# Patient Record
Sex: FEMALE | Race: BLACK OR AFRICAN AMERICAN | NOT HISPANIC OR LATINO | URBAN - METROPOLITAN AREA
[De-identification: names, ages, dates, MRNs, and addresses within clinical notes are randomized per-mention and may not be internally consistent; named-entity substitution may affect disease eponyms.]

---

## 2017-04-04 ENCOUNTER — EMERGENCY (EMERGENCY)
Facility: HOSPITAL | Age: 82
LOS: 1 days | Discharge: HOME | End: 2017-04-04
Attending: EMERGENCY MEDICINE | Admitting: HOSPITALIST
Payer: MEDICARE

## 2017-04-04 VITALS
SYSTOLIC BLOOD PRESSURE: 136 MMHG | HEART RATE: 60 BPM | OXYGEN SATURATION: 99 % | HEIGHT: 61 IN | DIASTOLIC BLOOD PRESSURE: 72 MMHG

## 2017-04-04 DIAGNOSIS — T14.8 OTHER INJURY OF UNSPECIFIED BODY REGION: ICD-10-CM

## 2017-04-04 DIAGNOSIS — I48.0 PAROXYSMAL ATRIAL FIBRILLATION: ICD-10-CM

## 2017-04-04 DIAGNOSIS — E78.00 PURE HYPERCHOLESTEROLEMIA, UNSPECIFIED: ICD-10-CM
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
## 2017-04-04 DIAGNOSIS — H40.9 UNSPECIFIED GLAUCOMA: ICD-10-CM

## 2017-04-04 DIAGNOSIS — R07.89 OTHER CHEST PAIN: ICD-10-CM
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
## 2017-04-04 DIAGNOSIS — L03.90 CELLULITIS, UNSPECIFIED: ICD-10-CM

## 2017-04-04 DIAGNOSIS — Z95.0 PRESENCE OF CARDIAC PACEMAKER: Chronic | ICD-10-CM

## 2017-04-04 LAB
ALBUMIN SERPL ELPH-MCNC: 3.1 G/DL — LOW (ref 3.3–5)
ALP SERPL-CCNC: 115 U/L — SIGNIFICANT CHANGE UP (ref 40–120)
ALT FLD-CCNC: 34 U/L — SIGNIFICANT CHANGE UP (ref 12–78)
ANION GAP SERPL CALC-SCNC: 6 MMOL/L — SIGNIFICANT CHANGE UP (ref 5–17)
APTT BLD: 30.7 SEC — SIGNIFICANT CHANGE UP (ref 27.5–37.4)
AST SERPL-CCNC: 30 U/L — SIGNIFICANT CHANGE UP (ref 15–37)
BASOPHILS # BLD AUTO: 0.1 K/UL — SIGNIFICANT CHANGE UP (ref 0–0.2)
BASOPHILS NFR BLD AUTO: 1.3 % — SIGNIFICANT CHANGE UP (ref 0–2)
BILIRUB SERPL-MCNC: 0.3 MG/DL — SIGNIFICANT CHANGE UP (ref 0.2–1.2)
BUN SERPL-MCNC: 31 MG/DL — HIGH (ref 7–23)
CALCIUM SERPL-MCNC: 8.5 MG/DL — SIGNIFICANT CHANGE UP (ref 8.5–10.1)
CHLORIDE SERPL-SCNC: 104 MMOL/L — SIGNIFICANT CHANGE UP (ref 96–108)
CK MB BLD-MCNC: 1.6 % — SIGNIFICANT CHANGE UP (ref 0–3.5)
CK MB CFR SERPL CALC: 1.1 NG/ML — SIGNIFICANT CHANGE UP (ref 0.5–3.6)
CK SERPL-CCNC: 67 U/L — SIGNIFICANT CHANGE UP (ref 26–192)
CO2 SERPL-SCNC: 34 MMOL/L — HIGH (ref 22–31)
CREAT SERPL-MCNC: 1.3 MG/DL — SIGNIFICANT CHANGE UP (ref 0.5–1.3)
EOSINOPHIL # BLD AUTO: 0.1 K/UL — SIGNIFICANT CHANGE UP (ref 0–0.5)
EOSINOPHIL NFR BLD AUTO: 2.4 % — SIGNIFICANT CHANGE UP (ref 0–6)
GLUCOSE SERPL-MCNC: 112 MG/DL — HIGH (ref 70–99)
HCT VFR BLD CALC: 34.7 % — SIGNIFICANT CHANGE UP (ref 34.5–45)
HGB BLD-MCNC: 11.4 G/DL — LOW (ref 11.5–15.5)
INR BLD: 1.05 RATIO — SIGNIFICANT CHANGE UP (ref 0.88–1.16)
LYMPHOCYTES # BLD AUTO: 1 K/UL — SIGNIFICANT CHANGE UP (ref 1–3.3)
LYMPHOCYTES # BLD AUTO: 17.8 % — SIGNIFICANT CHANGE UP (ref 13–44)
MCHC RBC-ENTMCNC: 29.8 PG — SIGNIFICANT CHANGE UP (ref 27–34)
MCHC RBC-ENTMCNC: 33 GM/DL — SIGNIFICANT CHANGE UP (ref 32–36)
MCV RBC AUTO: 90.3 FL — SIGNIFICANT CHANGE UP (ref 80–100)
MONOCYTES # BLD AUTO: 0.9 K/UL — SIGNIFICANT CHANGE UP (ref 0–0.9)
MONOCYTES NFR BLD AUTO: 15.4 % — HIGH (ref 2–14)
NEUTROPHILS # BLD AUTO: 3.5 K/UL — SIGNIFICANT CHANGE UP (ref 1.8–7.4)
NEUTROPHILS NFR BLD AUTO: 63 % — SIGNIFICANT CHANGE UP (ref 43–77)
PLATELET # BLD AUTO: 139 K/UL — LOW (ref 150–400)
POTASSIUM SERPL-MCNC: 3 MMOL/L — LOW (ref 3.5–5.3)
POTASSIUM SERPL-SCNC: 3 MMOL/L — LOW (ref 3.5–5.3)
PROT SERPL-MCNC: 7.1 GM/DL — SIGNIFICANT CHANGE UP (ref 6–8.3)
PROTHROM AB SERPL-ACNC: 11.5 SEC — SIGNIFICANT CHANGE UP (ref 9.8–12.7)
RBC # BLD: 3.84 M/UL — SIGNIFICANT CHANGE UP (ref 3.8–5.2)
RBC # FLD: 13.1 % — SIGNIFICANT CHANGE UP (ref 11–15)
SODIUM SERPL-SCNC: 144 MMOL/L — SIGNIFICANT CHANGE UP (ref 135–145)
TROPONIN I SERPL-MCNC: 0.03 NG/ML — SIGNIFICANT CHANGE UP (ref 0.01–0.04)
WBC # BLD: 5.5 K/UL — SIGNIFICANT CHANGE UP (ref 3.8–10.5)
WBC # FLD AUTO: 5.5 K/UL — SIGNIFICANT CHANGE UP (ref 3.8–10.5)

## 2017-04-04 PROCEDURE — 99285 EMERGENCY DEPT VISIT HI MDM: CPT

## 2017-04-04 PROCEDURE — 93971 EXTREMITY STUDY: CPT | Mod: 26,RT

## 2017-04-04 NOTE — ED ADULT TRIAGE NOTE - CHIEF COMPLAINT QUOTE
pt c/o of hole in the right lower  leg (calf)  since January visiting nurse and doctor  have checked it when she went to [facility] they gave her antibiotics but its  gotten deeper  its infected and her pacemaker felt like it moved.

## 2017-04-04 NOTE — ED PROVIDER NOTE - MEDICAL DECISION MAKING DETAILS
Pt w above dx, no improvement w outpt tx of abx.  US w ?chronic DVT.  Pt given abx in ED, admitted for further eval/mgmt.

## 2017-04-04 NOTE — ED ADULT NURSE NOTE - CHIEF COMPLAINT QUOTE
pt c/o of hole in the right lower  leg (calf)  since January visiting nurse and doctor  have checked it when she went to [facility] they gave her antibiotics but its  gotten deeper  its infected and her pacemaker felt like it moved.

## 2017-04-04 NOTE — ED PROVIDER NOTE - OBJECTIVE STATEMENT
Pertinent PMH/PSH/FHx/SHx and Review of Systems contained within:    86yo F w PMH of glaucoma, HTN, HL, paroxysmal Afib, CHF, s/p PPM presents to ED c/o RLE pain & chest discomfort.  Pt & daughter report she has completed abx for RLE but sx have not improved & pt has worsening pain & swelling in RLE.  Denies trauma, fever, chills, Pertinent PMH/PSH/FHx/SHx and Review of Systems contained within:    88yo F w PMH of glaucoma, HTN, HL, paroxysmal Afib, CHF, s/p PPM presents to ED c/o RLE pain & chest discomfort.  Pt & daughter report she has completed abx for RLE but sx have not improved & pt has worsening pain & swelling in RLE.  Denies trauma, fever, chills, SOB, abd pain, vomiting, diarrhea.  Pt states she also felt L anterior chest discomfort, described as a "wave."    No fever/chills, No photophobia/eye pain/changes in vision, No ear pain/sore throat/dysphagia, no cough/wheeze/stridor, No abdominal pain, No N/V/D, no dysuria/frequency/discharge, No neck/back pain, no rash, no changes in neurological status/function.

## 2017-04-04 NOTE — ED PROVIDER NOTE - PHYSICAL EXAMINATION
Gen: Alert, speaking in complete sentences;  Head: NC, AT, EOMI, normal lids/conjunctiva;  ENT: normal hearing, patent oropharynx, MMM;  Neck: supple, no tenderness/meningismus, FROM;  Pulm: Bilateral clear BS, normal resp effort, no wheeze/stridor/retractions;  CV: RRR, no M/R/G, +dist pulses, no TTP/fluctuance over PPM;  Abd: soft, NT/ND, +BS, no guarding/rebound tenderness;  Mskel: RLE swollen compared to LLE, +eschar over posterior calf + surrounding TTP, no active bleeding/drainage, sensation intact;  Skin: no rash;  Neuro: AAOx3, no sensory/motor deficits

## 2017-04-05 DIAGNOSIS — H40.9 UNSPECIFIED GLAUCOMA: ICD-10-CM

## 2017-04-05 DIAGNOSIS — E78.00 PURE HYPERCHOLESTEROLEMIA, UNSPECIFIED: ICD-10-CM

## 2017-04-05 DIAGNOSIS — Z29.9 ENCOUNTER FOR PROPHYLACTIC MEASURES, UNSPECIFIED: ICD-10-CM

## 2017-04-05 DIAGNOSIS — T14.8 OTHER INJURY OF UNSPECIFIED BODY REGION: ICD-10-CM

## 2017-04-05 DIAGNOSIS — R07.89 OTHER CHEST PAIN: ICD-10-CM

## 2017-04-05 DIAGNOSIS — I10 ESSENTIAL (PRIMARY) HYPERTENSION: ICD-10-CM

## 2017-04-05 DIAGNOSIS — I48.0 PAROXYSMAL ATRIAL FIBRILLATION: ICD-10-CM

## 2017-04-05 LAB
ANION GAP SERPL CALC-SCNC: 8 MMOL/L — SIGNIFICANT CHANGE UP (ref 5–17)
APPEARANCE UR: ABNORMAL
BILIRUB UR-MCNC: NEGATIVE — SIGNIFICANT CHANGE UP
BUN SERPL-MCNC: 26 MG/DL — HIGH (ref 7–23)
CALCIUM SERPL-MCNC: 8.5 MG/DL — SIGNIFICANT CHANGE UP (ref 8.5–10.1)
CHLORIDE SERPL-SCNC: 105 MMOL/L — SIGNIFICANT CHANGE UP (ref 96–108)
CHOLEST SERPL-MCNC: 137 MG/DL — SIGNIFICANT CHANGE UP (ref 10–199)
CK MB BLD-MCNC: 1.7 % — SIGNIFICANT CHANGE UP (ref 0–3.5)
CK MB BLD-MCNC: 1.8 % — SIGNIFICANT CHANGE UP (ref 0–3.5)
CK MB CFR SERPL CALC: 1 NG/ML — SIGNIFICANT CHANGE UP (ref 0.5–3.6)
CK MB CFR SERPL CALC: 1 NG/ML — SIGNIFICANT CHANGE UP (ref 0.5–3.6)
CK SERPL-CCNC: 55 U/L — SIGNIFICANT CHANGE UP (ref 26–192)
CK SERPL-CCNC: 60 U/L — SIGNIFICANT CHANGE UP (ref 26–192)
CO2 SERPL-SCNC: 32 MMOL/L — HIGH (ref 22–31)
COLOR SPEC: YELLOW — SIGNIFICANT CHANGE UP
CREAT SERPL-MCNC: 1.18 MG/DL — SIGNIFICANT CHANGE UP (ref 0.5–1.3)
DIFF PNL FLD: ABNORMAL
GLUCOSE SERPL-MCNC: 91 MG/DL — SIGNIFICANT CHANGE UP (ref 70–99)
GLUCOSE UR QL: NEGATIVE MG/DL — SIGNIFICANT CHANGE UP
HCT VFR BLD CALC: 34.3 % — LOW (ref 34.5–45)
HDLC SERPL-MCNC: 68 MG/DL — SIGNIFICANT CHANGE UP (ref 40–125)
HGB BLD-MCNC: 11.1 G/DL — LOW (ref 11.5–15.5)
KETONES UR-MCNC: NEGATIVE — SIGNIFICANT CHANGE UP
LEUKOCYTE ESTERASE UR-ACNC: ABNORMAL
LIPID PNL WITH DIRECT LDL SERPL: 62 MG/DL — SIGNIFICANT CHANGE UP
MCHC RBC-ENTMCNC: 29.4 PG — SIGNIFICANT CHANGE UP (ref 27–34)
MCHC RBC-ENTMCNC: 32.4 GM/DL — SIGNIFICANT CHANGE UP (ref 32–36)
MCV RBC AUTO: 91 FL — SIGNIFICANT CHANGE UP (ref 80–100)
NITRITE UR-MCNC: NEGATIVE — SIGNIFICANT CHANGE UP
PH UR: 6 — SIGNIFICANT CHANGE UP (ref 4.8–8)
PLATELET # BLD AUTO: 139 K/UL — LOW (ref 150–400)
POTASSIUM SERPL-MCNC: 3.2 MMOL/L — LOW (ref 3.5–5.3)
POTASSIUM SERPL-SCNC: 3.2 MMOL/L — LOW (ref 3.5–5.3)
PROT UR-MCNC: NEGATIVE MG/DL — SIGNIFICANT CHANGE UP
RBC # BLD: 3.77 M/UL — LOW (ref 3.8–5.2)
RBC # FLD: 13.2 % — SIGNIFICANT CHANGE UP (ref 11–15)
SODIUM SERPL-SCNC: 145 MMOL/L — SIGNIFICANT CHANGE UP (ref 135–145)
SP GR SPEC: 1.01 — SIGNIFICANT CHANGE UP (ref 1.01–1.02)
TOTAL CHOLESTEROL/HDL RATIO MEASUREMENT: 2 RATIO — LOW (ref 3.3–7.1)
TRIGL SERPL-MCNC: 37 MG/DL — SIGNIFICANT CHANGE UP (ref 10–149)
TROPONIN I SERPL-MCNC: 0.04 NG/ML — SIGNIFICANT CHANGE UP (ref 0.01–0.04)
TROPONIN I SERPL-MCNC: 0.04 NG/ML — SIGNIFICANT CHANGE UP (ref 0.01–0.04)
UROBILINOGEN FLD QL: NEGATIVE MG/DL — SIGNIFICANT CHANGE UP
WBC # BLD: 5.6 K/UL — SIGNIFICANT CHANGE UP (ref 3.8–10.5)
WBC # FLD AUTO: 5.6 K/UL — SIGNIFICANT CHANGE UP (ref 3.8–10.5)

## 2017-04-05 PROCEDURE — 71010: CPT | Mod: 26

## 2017-04-05 PROCEDURE — 73590 X-RAY EXAM OF LOWER LEG: CPT | Mod: 26,RT

## 2017-04-05 PROCEDURE — 99223 1ST HOSP IP/OBS HIGH 75: CPT

## 2017-04-05 RX ORDER — LOSARTAN POTASSIUM 100 MG/1
50 TABLET, FILM COATED ORAL DAILY
Qty: 0 | Refills: 0 | Status: DISCONTINUED | OUTPATIENT
Start: 2017-04-05 | End: 2017-04-07

## 2017-04-05 RX ORDER — POTASSIUM CHLORIDE 20 MEQ
40 PACKET (EA) ORAL ONCE
Qty: 0 | Refills: 0 | Status: COMPLETED | OUTPATIENT
Start: 2017-04-05 | End: 2017-04-05

## 2017-04-05 RX ORDER — DORZOLAMIDE HYDROCHLORIDE 20 MG/ML
1 SOLUTION/ DROPS OPHTHALMIC AT BEDTIME
Qty: 0 | Refills: 0 | Status: DISCONTINUED | OUTPATIENT
Start: 2017-04-05 | End: 2017-04-05

## 2017-04-05 RX ORDER — BRIMONIDINE TARTRATE 2 MG/MG
1 SOLUTION/ DROPS OPHTHALMIC
Qty: 0 | Refills: 0 | Status: DISCONTINUED | OUTPATIENT
Start: 2017-04-05 | End: 2017-04-07

## 2017-04-05 RX ORDER — ACETAMINOPHEN 500 MG
650 TABLET ORAL EVERY 6 HOURS
Qty: 0 | Refills: 0 | Status: DISCONTINUED | OUTPATIENT
Start: 2017-04-05 | End: 2017-04-07

## 2017-04-05 RX ORDER — METOPROLOL TARTRATE 50 MG
25 TABLET ORAL DAILY
Qty: 0 | Refills: 0 | Status: DISCONTINUED | OUTPATIENT
Start: 2017-04-05 | End: 2017-04-07

## 2017-04-05 RX ORDER — POTASSIUM CHLORIDE 20 MEQ
40 PACKET (EA) ORAL EVERY 4 HOURS
Qty: 0 | Refills: 0 | Status: COMPLETED | OUTPATIENT
Start: 2017-04-05 | End: 2017-04-06

## 2017-04-05 RX ORDER — HEPARIN SODIUM 5000 [USP'U]/ML
5000 INJECTION INTRAVENOUS; SUBCUTANEOUS EVERY 8 HOURS
Qty: 0 | Refills: 0 | Status: DISCONTINUED | OUTPATIENT
Start: 2017-04-05 | End: 2017-04-07

## 2017-04-05 RX ORDER — AMIODARONE HYDROCHLORIDE 400 MG/1
200 TABLET ORAL DAILY
Qty: 0 | Refills: 0 | Status: DISCONTINUED | OUTPATIENT
Start: 2017-04-05 | End: 2017-04-07

## 2017-04-05 RX ORDER — DORZOLAMIDE HYDROCHLORIDE 20 MG/ML
1 SOLUTION/ DROPS OPHTHALMIC
Qty: 0 | Refills: 0 | Status: DISCONTINUED | OUTPATIENT
Start: 2017-04-05 | End: 2017-04-07

## 2017-04-05 RX ORDER — METOPROLOL TARTRATE 50 MG
50 TABLET ORAL
Qty: 0 | Refills: 0 | Status: DISCONTINUED | OUTPATIENT
Start: 2017-04-05 | End: 2017-04-05

## 2017-04-05 RX ORDER — LATANOPROST 0.05 MG/ML
1 SOLUTION/ DROPS OPHTHALMIC; TOPICAL AT BEDTIME
Qty: 0 | Refills: 0 | Status: DISCONTINUED | OUTPATIENT
Start: 2017-04-05 | End: 2017-04-05

## 2017-04-05 RX ORDER — SIMVASTATIN 20 MG/1
40 TABLET, FILM COATED ORAL AT BEDTIME
Qty: 0 | Refills: 0 | Status: DISCONTINUED | OUTPATIENT
Start: 2017-04-05 | End: 2017-04-06

## 2017-04-05 RX ORDER — PIPERACILLIN AND TAZOBACTAM 4; .5 G/20ML; G/20ML
3.38 INJECTION, POWDER, LYOPHILIZED, FOR SOLUTION INTRAVENOUS ONCE
Qty: 0 | Refills: 0 | Status: COMPLETED | OUTPATIENT
Start: 2017-04-05 | End: 2017-04-05

## 2017-04-05 RX ORDER — VANCOMYCIN HCL 1 G
1000 VIAL (EA) INTRAVENOUS ONCE
Qty: 0 | Refills: 0 | Status: COMPLETED | OUTPATIENT
Start: 2017-04-05 | End: 2017-04-05

## 2017-04-05 RX ORDER — PIPERACILLIN AND TAZOBACTAM 4; .5 G/20ML; G/20ML
3.38 INJECTION, POWDER, LYOPHILIZED, FOR SOLUTION INTRAVENOUS EVERY 8 HOURS
Qty: 0 | Refills: 0 | Status: DISCONTINUED | OUTPATIENT
Start: 2017-04-05 | End: 2017-04-06

## 2017-04-05 RX ORDER — BRIMONIDINE TARTRATE 2 MG/MG
1 SOLUTION/ DROPS OPHTHALMIC
Qty: 0 | Refills: 0 | Status: DISCONTINUED | OUTPATIENT
Start: 2017-04-05 | End: 2017-04-05

## 2017-04-05 RX ORDER — DORZOLAMIDE HYDROCHLORIDE 20 MG/ML
1 SOLUTION/ DROPS OPHTHALMIC
Qty: 0 | Refills: 0 | Status: DISCONTINUED | OUTPATIENT
Start: 2017-04-05 | End: 2017-04-05

## 2017-04-05 RX ORDER — LATANOPROST 0.05 MG/ML
1 SOLUTION/ DROPS OPHTHALMIC; TOPICAL AT BEDTIME
Qty: 0 | Refills: 0 | Status: DISCONTINUED | OUTPATIENT
Start: 2017-04-05 | End: 2017-04-07

## 2017-04-05 RX ADMIN — SIMVASTATIN 40 MILLIGRAM(S): 20 TABLET, FILM COATED ORAL at 23:11

## 2017-04-05 RX ADMIN — Medication 250 MILLIGRAM(S): at 03:21

## 2017-04-05 RX ADMIN — HEPARIN SODIUM 5000 UNIT(S): 5000 INJECTION INTRAVENOUS; SUBCUTANEOUS at 13:59

## 2017-04-05 RX ADMIN — Medication 1 DROP(S): at 18:56

## 2017-04-05 RX ADMIN — PIPERACILLIN AND TAZOBACTAM 25 GRAM(S): 4; .5 INJECTION, POWDER, LYOPHILIZED, FOR SOLUTION INTRAVENOUS at 18:56

## 2017-04-05 RX ADMIN — LOSARTAN POTASSIUM 50 MILLIGRAM(S): 100 TABLET, FILM COATED ORAL at 06:54

## 2017-04-05 RX ADMIN — Medication 25 MILLIGRAM(S): at 11:48

## 2017-04-05 RX ADMIN — BRIMONIDINE TARTRATE 1 DROP(S): 2 SOLUTION/ DROPS OPHTHALMIC at 06:54

## 2017-04-05 RX ADMIN — PIPERACILLIN AND TAZOBACTAM 25 GRAM(S): 4; .5 INJECTION, POWDER, LYOPHILIZED, FOR SOLUTION INTRAVENOUS at 11:49

## 2017-04-05 RX ADMIN — Medication 40 MILLIEQUIVALENT(S): at 23:11

## 2017-04-05 RX ADMIN — BRIMONIDINE TARTRATE 1 DROP(S): 2 SOLUTION/ DROPS OPHTHALMIC at 18:57

## 2017-04-05 RX ADMIN — Medication 40 MILLIEQUIVALENT(S): at 18:56

## 2017-04-05 RX ADMIN — HEPARIN SODIUM 5000 UNIT(S): 5000 INJECTION INTRAVENOUS; SUBCUTANEOUS at 06:54

## 2017-04-05 RX ADMIN — HEPARIN SODIUM 5000 UNIT(S): 5000 INJECTION INTRAVENOUS; SUBCUTANEOUS at 23:11

## 2017-04-05 RX ADMIN — LATANOPROST 1 DROP(S): 0.05 SOLUTION/ DROPS OPHTHALMIC; TOPICAL at 23:10

## 2017-04-05 RX ADMIN — DORZOLAMIDE HYDROCHLORIDE 1 DROP(S): 20 SOLUTION/ DROPS OPHTHALMIC at 18:57

## 2017-04-05 RX ADMIN — Medication 650 MILLIGRAM(S): at 12:32

## 2017-04-05 RX ADMIN — AMIODARONE HYDROCHLORIDE 200 MILLIGRAM(S): 400 TABLET ORAL at 06:54

## 2017-04-05 RX ADMIN — DORZOLAMIDE HYDROCHLORIDE 1 DROP(S): 20 SOLUTION/ DROPS OPHTHALMIC at 06:54

## 2017-04-05 RX ADMIN — Medication 40 MILLIEQUIVALENT(S): at 01:03

## 2017-04-05 RX ADMIN — PIPERACILLIN AND TAZOBACTAM 200 GRAM(S): 4; .5 INJECTION, POWDER, LYOPHILIZED, FOR SOLUTION INTRAVENOUS at 02:52

## 2017-04-05 NOTE — H&P ADULT. - ASSESSMENT
87 woman with PMH of glaucoma, HTN, HL, paroxysmal Afib, CHF, s/p PPM presents to ED with daughter c/o RLE pain & chest discomfort.  Patient failed outpatient PO treatment of her RLE Cellulitis and will require admission of at least 2 midnights for further treatment and mgmt as detailed below.  As she has a PPM and complained of palpitations, her pacemaker needs to be interrogated as well while on telemetry.

## 2017-04-05 NOTE — PHYSICAL THERAPY INITIAL EVALUATION ADULT - GENERAL OBSERVATIONS, REHAB EVAL
Pt encountered supine in bed + telemetry, IV LUE, eye glasses donned, diaper donned, daughter at bedside

## 2017-04-05 NOTE — PHYSICAL THERAPY INITIAL EVALUATION ADULT - ADDITIONAL COMMENTS
Pt lives alone in a private single story home, 5 entry steps (+ rail), no steps inside to negotiate. Prior to admission pt was independent with all functional mobility including community ambulation with rollator. Pt is right hand dominant & wears eye glasses for reading & distance. Pt reports insidious onset of RLE wound. Pt endorses urgent care visit with PO antibiotics without resolution. No formal wound care. Pt endorses having home health aide 5 hours/day, 5 days/week. Goal of therapy: return home.

## 2017-04-05 NOTE — PHYSICAL THERAPY INITIAL EVALUATION ADULT - MODIFIED CLINICAL TEST OF SENSORY INTEGRATION IN BALANCE TEST
Barthel Index: Feeding Score _10/10__, Bathing Score _0/5__, Grooming Score _0/5__, Dressing Score _5/10__, Bowels Score __10/10_, Bladder Score _5/10__, Toilet Score _5/10__, Transfers Score _10/15__, Mobility Score __0/15_, Stairs Score _0/10__,     Total Score ___45/100

## 2017-04-05 NOTE — PHYSICAL THERAPY INITIAL EVALUATION ADULT - PERTINENT HX OF CURRENT PROBLEM, REHAB EVAL
Pt is an 88yo F admitted secondary to RLE pain & redness around wound as well as + CP. X-ray chest: non-contributory. US Duplex RLE: no acute DVT, chronic RLE DVT. X-ray R tib/fib: no acute fracture or dislocation.

## 2017-04-05 NOTE — H&P ADULT. - EXTREMITIES COMMENTS
RLE: large 4 x 6 cm unstagable venous stasis ulcer with no discharge and an area of surrounding erythema

## 2017-04-05 NOTE — H&P ADULT. - HISTORY OF PRESENT ILLNESS
87 woman with PMH of glaucoma, HTN, HL, paroxysmal Afib, CHF, s/p PPM presents to ED with daughter c/o RLE pain & chest discomfort.  As per daughter, 87 woman with PMH of glaucoma, HTN, HL, paroxysmal Afib, CHF, s/p PPM presents to ED with daughter c/o RLE pain & chest discomfort.  As per daughter, patient has a right leg ulcer which had a small infection that her PMD was treating with Bactrim.  The area of redness has gotten larger since starting the Bactrim.  Patient also says that 2 days ago, her chest felt "funny" and cannot elaborate, almost as if "something was jumping".  She denies associated chest pain, SOB, lightheadedness.  She denies fever, chills, nausea, vomiting, diarrhea.

## 2017-04-05 NOTE — H&P ADULT. - PMH
Glaucoma    High cholesterol    HTN (hypertension)    PAF (paroxysmal atrial fibrillation)    Pulmonary hypertension

## 2017-04-05 NOTE — H&P ADULT. - RS GEN PE MLT RESP DETAILS PC
good air movement/clear to auscultation bilaterally/respirations non-labored/no rales/airway patent/no wheezes/breath sounds equal/no rhonchi

## 2017-04-05 NOTE — H&P ADULT. - PROBLEM SELECTOR PLAN 1
Vancomycin 1 gm IVPB x 1 given in ED as well as Zosyn  Continue vanco and zosyn for now, ID consult  Send Blood Cx 2 sets.  Monitor CBC & BMP Qdaily.  Right doppler neg for acute DVT  Follow official reading XR leg  Tylenol 650mg po q6hrs PRN for fever/pain.  Wound care consult  As per daughter, patient's PMD is working her up as outpatient for PVD, obtain records.

## 2017-04-05 NOTE — PATIENT PROFILE ADULT. - NUTRITION PROFILE
no indicators present/do you have any questions about your prescribed diet?/lactose intolerance, poor apetite

## 2017-04-05 NOTE — PHYSICAL THERAPY INITIAL EVALUATION ADULT - IMPAIRMENTS FOUND, PT EVAL
bed mobility, transfers, stair negotiation/gait, locomotion, and balance/integumentary integrity/muscle strength/aerobic capacity/endurance

## 2017-04-05 NOTE — PHYSICAL THERAPY INITIAL EVALUATION ADULT - PLANNED THERAPY INTERVENTIONS, PT EVAL
transfer training/stair negotiation/strengthening/balance training/bed mobility training/gait training

## 2017-04-05 NOTE — H&P ADULT. - PROBLEM SELECTOR PLAN 2
Symptoms sound like palpitations  Telemetry monitoring  Follow CXR  Will trend troponin q 8h  Cardiology consult, PPM interrogation

## 2017-04-05 NOTE — H&P ADULT. - NEGATIVE CARDIOVASCULAR SYMPTOMS
no paroxysmal nocturnal dyspnea/no peripheral edema/no claudication/no dyspnea on exertion/no chest pain/no orthopnea

## 2017-04-05 NOTE — CONSULT NOTE ADULT - PROBLEM SELECTOR RECOMMENDATION 9
wound has a scab ,per daughter scabs off and there is pus underneath .Not fluctuant on exam ,I doubt any abscess  switch  to oral antibiotics very soon (had worsened with bactrim 0  would care consult

## 2017-04-06 DIAGNOSIS — L03.90 CELLULITIS, UNSPECIFIED: ICD-10-CM

## 2017-04-06 LAB
ALBUMIN SERPL ELPH-MCNC: 2.5 G/DL — LOW (ref 3.3–5)
ALP SERPL-CCNC: 96 U/L — SIGNIFICANT CHANGE UP (ref 40–120)
ALT FLD-CCNC: 27 U/L — SIGNIFICANT CHANGE UP (ref 12–78)
ANION GAP SERPL CALC-SCNC: 5 MMOL/L — SIGNIFICANT CHANGE UP (ref 5–17)
AST SERPL-CCNC: 27 U/L — SIGNIFICANT CHANGE UP (ref 15–37)
BILIRUB SERPL-MCNC: 0.9 MG/DL — SIGNIFICANT CHANGE UP (ref 0.2–1.2)
BUN SERPL-MCNC: 22 MG/DL — SIGNIFICANT CHANGE UP (ref 7–23)
CALCIUM SERPL-MCNC: 8.6 MG/DL — SIGNIFICANT CHANGE UP (ref 8.5–10.1)
CHLORIDE SERPL-SCNC: 108 MMOL/L — SIGNIFICANT CHANGE UP (ref 96–108)
CO2 SERPL-SCNC: 32 MMOL/L — HIGH (ref 22–31)
CREAT SERPL-MCNC: 1.11 MG/DL — SIGNIFICANT CHANGE UP (ref 0.5–1.3)
ERYTHROCYTE [SEDIMENTATION RATE] IN BLOOD: 71 MM/HR — HIGH (ref 0–20)
GLUCOSE SERPL-MCNC: 94 MG/DL — SIGNIFICANT CHANGE UP (ref 70–99)
HBA1C BLD-MCNC: 6.5 % — HIGH (ref 4–5.6)
HCT VFR BLD CALC: 34.7 % — SIGNIFICANT CHANGE UP (ref 34.5–45)
HGB BLD-MCNC: 11.2 G/DL — LOW (ref 11.5–15.5)
MCHC RBC-ENTMCNC: 29.5 PG — SIGNIFICANT CHANGE UP (ref 27–34)
MCHC RBC-ENTMCNC: 32.4 GM/DL — SIGNIFICANT CHANGE UP (ref 32–36)
MCV RBC AUTO: 91.1 FL — SIGNIFICANT CHANGE UP (ref 80–100)
PLATELET # BLD AUTO: 129 K/UL — LOW (ref 150–400)
POTASSIUM SERPL-MCNC: 4.7 MMOL/L — SIGNIFICANT CHANGE UP (ref 3.5–5.3)
POTASSIUM SERPL-SCNC: 4.7 MMOL/L — SIGNIFICANT CHANGE UP (ref 3.5–5.3)
PROT SERPL-MCNC: 6.4 GM/DL — SIGNIFICANT CHANGE UP (ref 6–8.3)
RBC # BLD: 3.81 M/UL — SIGNIFICANT CHANGE UP (ref 3.8–5.2)
RBC # FLD: 13.4 % — SIGNIFICANT CHANGE UP (ref 11–15)
RHEUMATOID FACT SERPL-ACNC: <7 IU/ML — SIGNIFICANT CHANGE UP (ref 0–13.9)
SODIUM SERPL-SCNC: 145 MMOL/L — SIGNIFICANT CHANGE UP (ref 135–145)
WBC # BLD: 5 K/UL — SIGNIFICANT CHANGE UP (ref 3.8–10.5)
WBC # FLD AUTO: 5 K/UL — SIGNIFICANT CHANGE UP (ref 3.8–10.5)

## 2017-04-06 PROCEDURE — 99232 SBSQ HOSP IP/OBS MODERATE 35: CPT

## 2017-04-06 PROCEDURE — 99233 SBSQ HOSP IP/OBS HIGH 50: CPT

## 2017-04-06 PROCEDURE — 93923 UPR/LXTR ART STDY 3+ LVLS: CPT | Mod: 26

## 2017-04-06 RX ORDER — SIMVASTATIN 20 MG/1
20 TABLET, FILM COATED ORAL AT BEDTIME
Qty: 0 | Refills: 0 | Status: DISCONTINUED | OUTPATIENT
Start: 2017-04-06 | End: 2017-04-07

## 2017-04-06 RX ORDER — COLLAGENASE CLOSTRIDIUM HIST. 250 UNIT/G
1 OINTMENT (GRAM) TOPICAL DAILY
Qty: 0 | Refills: 0 | Status: DISCONTINUED | OUTPATIENT
Start: 2017-04-06 | End: 2017-04-07

## 2017-04-06 RX ADMIN — DORZOLAMIDE HYDROCHLORIDE 1 DROP(S): 20 SOLUTION/ DROPS OPHTHALMIC at 17:02

## 2017-04-06 RX ADMIN — HEPARIN SODIUM 5000 UNIT(S): 5000 INJECTION INTRAVENOUS; SUBCUTANEOUS at 21:22

## 2017-04-06 RX ADMIN — BRIMONIDINE TARTRATE 1 DROP(S): 2 SOLUTION/ DROPS OPHTHALMIC at 06:15

## 2017-04-06 RX ADMIN — SIMVASTATIN 20 MILLIGRAM(S): 20 TABLET, FILM COATED ORAL at 21:25

## 2017-04-06 RX ADMIN — LATANOPROST 1 DROP(S): 0.05 SOLUTION/ DROPS OPHTHALMIC; TOPICAL at 21:21

## 2017-04-06 RX ADMIN — Medication 40 MILLIEQUIVALENT(S): at 01:52

## 2017-04-06 RX ADMIN — Medication 1 DROP(S): at 17:03

## 2017-04-06 RX ADMIN — HEPARIN SODIUM 5000 UNIT(S): 5000 INJECTION INTRAVENOUS; SUBCUTANEOUS at 06:16

## 2017-04-06 RX ADMIN — PIPERACILLIN AND TAZOBACTAM 25 GRAM(S): 4; .5 INJECTION, POWDER, LYOPHILIZED, FOR SOLUTION INTRAVENOUS at 17:02

## 2017-04-06 RX ADMIN — LOSARTAN POTASSIUM 50 MILLIGRAM(S): 100 TABLET, FILM COATED ORAL at 06:16

## 2017-04-06 RX ADMIN — PIPERACILLIN AND TAZOBACTAM 25 GRAM(S): 4; .5 INJECTION, POWDER, LYOPHILIZED, FOR SOLUTION INTRAVENOUS at 09:39

## 2017-04-06 RX ADMIN — BRIMONIDINE TARTRATE 1 DROP(S): 2 SOLUTION/ DROPS OPHTHALMIC at 17:03

## 2017-04-06 RX ADMIN — Medication 1 APPLICATION(S): at 17:42

## 2017-04-06 RX ADMIN — PIPERACILLIN AND TAZOBACTAM 25 GRAM(S): 4; .5 INJECTION, POWDER, LYOPHILIZED, FOR SOLUTION INTRAVENOUS at 01:51

## 2017-04-06 RX ADMIN — AMIODARONE HYDROCHLORIDE 200 MILLIGRAM(S): 400 TABLET ORAL at 06:14

## 2017-04-06 RX ADMIN — Medication 25 MILLIGRAM(S): at 06:14

## 2017-04-06 RX ADMIN — Medication 1 DROP(S): at 06:15

## 2017-04-06 RX ADMIN — DORZOLAMIDE HYDROCHLORIDE 1 DROP(S): 20 SOLUTION/ DROPS OPHTHALMIC at 06:15

## 2017-04-06 NOTE — CHART NOTE - NSCHARTNOTEFT_GEN_A_CORE
Upon Nutritional Assessment by the Registered Dietitian your patient was determined to meet criteria / has evidence of the following diagnosis/diagnoses:          [ ]  Mild Protein Calorie Malnutrition        [ ]  Moderate Protein Calorie Malnutrition        [x ] Severe Protein Calorie Malnutrition        [ ] Unspecified Protein Calorie Malnutrition        [ ] Underweight / BMI <19        [ ] Morbid Obesity / BMI > 40      Findings as based on:  •  Comprehensive nutrition assessment and consultation  •  Calorie counts (nutrient intake analysis)  •  Food acceptance and intake status from observations by staff  •  Follow up  •  Patient education  •  Intervention secondary to interdisciplinary rounds  •   concerns      Treatment:    The following diet has been recommended: Dash/TLC, Mechanical Soft, lactose free c Ensure pudding 3x/d (510 Kcal & 12 g pro) & 1 Pkg ProSource ( 15 g pro & 60 Kcal).      PROVIDER Section:     By signing this assessment you are acknowledging and agree with the diagnosis/diagnoses assigned by the Registered Dietitian    Comments:

## 2017-04-06 NOTE — CONSULT NOTE ADULT - SUBJECTIVE AND OBJECTIVE BOX
CARDIOLOGY CONSULT NOTE    Patient is a 87y Female with a known history of :  Preventive measure (Z29.9)  Glaucoma of both eyes, unspecified glaucoma (H40.9)  High cholesterol (E78.00)  PAF (paroxysmal atrial fibrillation) (I48.0)  Essential hypertension (I10)  Chest discomfort (R07.89)  Infected wound (T14.8)    HPI:  87 woman with PMH of HTN, HLD, paroxysmal Afib, CHF, h/o PPM presents to ED with daughter c/o RLE pain & chest discomfort.   As per daughter, patient has a right leg ulcer which had a small infection that her PMD was treating with Bactrim.  The area of redness has gotten larger since starting the Bactrim.  Patient also says that 2 days ago, her chest felt "funny" and cannot elaborate, almost as if "something was jumping" at the site of the pacemaker..  She denies associated chest pain, SOB, lightheadedness.  She denies fever, chills, nausea, vomiting, diarrhea. she apparently was being evaluated by her primary care physician for treatment of her nonhealing wound ulcer; the daughter says she had multiple cardiac tests done recently but does not know the results thereof.      REVIEW OF SYSTEMS:    CONSTITUTIONAL: No fever, weight loss, or fatigue  EYES: No eye pain, visual disturbances, or discharge  ENMT:  No difficulty hearing, tinnitus, vertigo; No sinus or throat pain  NECK: No pain or stiffness  BREASTS: No pain, masses, or nipple discharge  RESPIRATORY: No cough, wheezing, chills or hemoptysis; No shortness of breath  CARDIOVASCULAR: as per history of present illness  GASTROINTESTINAL: No abdominal or epigastric pain. No nausea, vomiting, or hematemesis; No diarrhea or constipation. No melena or hematochezia.  GENITOURINARY: No dysuria, frequency, hematuria, or incontinence  NEUROLOGICAL: No headaches, memory loss, loss of strength, numbness, or tremors  SKIN: No itching, burning, rashes, or lesions   LYMPH NODES: No enlarged glands  ENDOCRINE: No heat or cold intolerance; No hair loss  MUSCULOSKELETAL: No joint pain or swelling; No muscle, back, or extremity pain  PSYCHIATRIC: No depression, anxiety, mood swings, or difficulty sleeping  HEME/LYMPH: No easy bruising, or bleeding gums  ALLERGY AND IMMUNOLOGIC: No hives or eczema    MEDICATIONS  (STANDING):  losartan 50milliGRAM(s) Oral daily  amiodarone    Tablet 200milliGRAM(s) Oral daily  simvastatin 40milliGRAM(s) Oral at bedtime  hydrochlorothiazide    Capsule 12.5milliGRAM(s) Oral daily  piperacillin/tazobactam IVPB. 3.375Gram(s) IV Intermittent every 8 hours  heparin  Injectable 5000Unit(s) SubCutaneous every 8 hours  metoprolol succinate ER 25milliGRAM(s) Oral daily (was on tartarate 50 mg b.i.d. until recently)  artificial  tears Solution 1Drop(s) Both EYES two times a day  latanoprost 0.005% Ophthalmic Solution 1Drop(s) Right EYE at bedtime  dorzolamide 2% Ophthalmic Solution 1Drop(s) Right EYE two times a day  brimonidine 0.2% Ophthalmic Solution 1Drop(s) Right EYE two times a day  potassium chloride    Tablet ER 40milliEquivalent(s) Oral every 4 hours    MEDICATIONS  (PRN):  acetaminophen   Tablet 650milliGRAM(s) Oral every 6 hours PRN For Temp greater than 38 C (100.4 F) and mild pain      ALLERGIES: lactose (Unknown)  No Known Drug Allergies      FAMILY HISTORY:  No pertinent family history in first degree relatives  No pertinent family history in first degree relatives      PHYSICAL EXAMINATION:  -----------------------------  T(C): 36.1, Max: 36.9 (04-05 @ 05:52)  HR: 60 (60 - 72)  BP: 113/55 (102/47 - 169/74)  RR: 16 (16 - 20)  SpO2: 98% (98% - 100%)  Wt(kg): --    I & Os for current day (as of 04-05 @ 17:30)  =============================================  IN:    Oral Fluid: 240 ml    Total IN: 240 ml  ---------------------------------------------  OUT:    Total OUT: 0 ml  ---------------------------------------------  Total NET: 240 ml    Height (cm): 154.9 (04-04 @ 21:12)  Weight (kg): 48.4 (04-05 @ 05:52)  BMI (kg/m2): 20.2 (04-05 @ 05:52)  BSA (m2): 1.45 (04-05 @ 05:52)    Constitutional: well developed, normal appearance, well groomed, well nourished, no deformities and no acute distress.   Eyes: the conjunctiva exhibited no abnormalities and the eyelids demonstrated no xanthelasmas.   HEENT: normal oral mucosa, no oral pallor and no oral cyanosis.   Neck: normal jugular venous A waves present, normal jugular venous V waves present and no jugular venous white A waves.   Pulmonary: no respiratory distress, normal respiratory rhythm and effort, no accessory muscle use and lungs were clear to auscultation bilaterally.   Cardiovascular: heart rate and rhythm were normal, normal S1 and S2 and no murmur, gallop, rub, heave or thrill are present.   Abdomen: soft, non-tender, no hepato-splenomegaly and no abdominal mass palpated.   Musculoskeletal: the gait could not be assessed..   Extremities: right lower extremity mildly edematous as compared to left lower extremity.ulceration dressed  Skin: normal skin color and pigmentation, no rash, no venous stasis, no skin lesions, no skin ulcer and no xanthoma was observed.   Psychiatric: oriented to person, place, and time, the affect was normal, the mood was normal and not feeling anxious.     LABS:   --------  04-05    145  |  105  |  26<H>  ----------------------------<  91  3.2<L>   |  32<H>  |  1.18    Ca    8.5      05 Apr 2017 10:26    TPro  7.1  /  Alb  3.1<L>  /  TBili  0.3  /  DBili  x   /  AST  30  /  ALT  34  /  AlkPhos  115  04-04                         11.1   5.6   )-----------( 139      ( 05 Apr 2017 10:26 )             34.3     PT/INR - ( 04 Apr 2017 23:26 )   PT: 11.5 sec;   INR: 1.05 ratio         PTT - ( 04 Apr 2017 23:26 )  PTT:30.7 sec    04-05 @ 15:45 CPK total:--, CKMB --, Troponin I - .037 ng/mL  04-05 @ 10:26 CPK total:--, CKMB --, Troponin I - .038 ng/mL  04-04 @ 23:26 CPK total:--, CKMB --, Troponin I - .033 ng/mL    137 mg/dL, 62 mg/dL, 68 mg/dL, 37 mg/dL        RADIOLOGY:  -----------------      EXAM:  CHEST SINGLE VIEW                            PROCEDURE DATE:  04/05/2017        INTERPRETATION:  AP sitting chest on April 4 at 11:57 PM. Patient has   left-sided chest discomfort and day right leg wound.    Heart suggest enlargement. Left-sided pacemaker again noted.      Presently the lungs are clear. On November 6 there was a mild right base   effusion and possibly an infiltrate in the right upper lobe.    IMPRESSION: No acute lung finding. Other findings as above including   pacemaker.       EXAM:  TTE WO CON COMPLETE W DOPPL        *** ADDENDUM 10/25/2016  ***    REPORT:    TRANSTHORACIC ECHOCARDIOGRAM REPORT           Patient Name:   CHIQUIS MOLINA Patient Location: [facility] Rec #:  [id number]  Accession #:      [id number]  Account #:                Height:           62.0 in 157.5 cm  YOB: 1929    Weight:           108.0 lb 49.00 kg  Patient Age:    87 years    BSA:              1.47 m²  Patient Gender: F           BP:               113/54 mmHg        Date of Exam:10/25/2016 5:24:43 PM  Sonographer:  LARA     Indications: I50.9 Heart failure, unspecified  Diagnosis:   I34.0 Nonrheumatic mitral (valve) insufficiency           2D AND M-MODE MEASUREMENTS (normal ranges within parentheses):  Left Ventricle:       Normal    Aorta/Left Atrium:           Normal  IVSd (2D):              1.06 cm (0.7-1.1) Aortic Root (2D):  2.88 cm   (2.4-3.7)  LVPWd (2D):             0.84 cm (0.7-1.1) Left Atrium (2D):  3.50 cm   (1.9-4.0)  LVIDd (2D):             4.41 cm (3.4-5.7) Right Ventricle:  LVIDs (2D):             3.08 cm           TAPSE:           2.10 cm  LV FS (2D):             30.1 %  (>25%)  Relative Wall Thickness 0.38    (<0.42)     SPECTRAL DOPPLER ANALYSIS (where applicable):  Tricuspid Valve and PA/RV Systolic Pressure: TR Max Velocity: 4.79 m/s RA   Pressure: 5 mmHg RVSP/PASP: 96.8 mmHg        PHYSICIAN INTERPRETATION:  Left Ventricle: Normal left ventricular size and wall thicknesses, with   normal systolic and diastolic function. The left ventricular internal   cavity size is normal. Left ventricular wall thickness is normal.  Global LV systolic function was normal. Left ventricular ejection   fraction, by visual estimation, is 60 to 65%.  Right Ventricle: Normal right ventricular size andfunction.  Left Atrium: The left atrium is normal in size.  Right Atrium: The right atrium is normal in size. The right atrial   pressure is normal. The right atrium is normal in size.  Pericardium: There is no evidence of pericardial effusion. Thereis a   small pleural effusion in both left and right lateral regions.  Mitral Valve: The mitral valve is degenerative in appearance. Mitral   leaflet mobility is normal. No evidence of mitral stenosis. Mild mitral   valve regurgitation is seen.  Tricuspid Valve: Structurally normal tricuspid valve, with normal leaflet   excursion. The tricuspid valve is degenerative in appearance.   Moderate-severe tricuspid regurgitation is visualized. Estimated   pulmonary artery systolic pressure is 96.8 mmHg assuming a right atrial   pressure of 5 mmHg, which is consistent with severe pulmonary   hypertension.  Aortic Valve: No evidence of aortic stenosis. Sclerotic aortic valve with   normal opening. Mild aortic valve regurgitation is seen.  Pulmonic Valve: Structurally normal pulmonic valve, with normal leaflet   excursion. Moderate pulmonic valve regurgitation.  Aorta: The aortic root is normal in size and structure.  Pulmonary Artery: The main pulmonary artery is normal in size. Pulmonary   hypertension is present.  Additional Comments: A pacer wire is visualized in the right atrium and   right ventricle.     Summary:   1. Left ventricular ejection fraction, by visual estimation, is 60 to   65%.   2. Normal global left ventricular systolic function.   3. Normal left ventricular internal cavity size.   4. Normal left ventricular size and wall thicknesses, with normal   systolic and diastolic function.   5. Normal right ventricular size and function.   6. The left atrium is normal in size.  7. The right atrium is normal in size.   8. Degenerative mitral valve.   9. Mild mitral valve regurgitation.  10. Degenerative tricuspid valve.  11. Moderate-severe tricuspid regurgitation.  12. Structurally normal tricuspid valve, with normal leaflet excursion.  13. Mild aortic regurgitation.  14. Sclerotic aortic valve with normal opening.  15. Moderate pulmonic valve regurgitation.  16. Structurally normal pulmonic valve, with normal leaflet excursion.  17. Estimated pulmonary artery systolic pressure is 96.8 mmHg assuming a   right atrial pressure of 5 mmHg, which is consistent with severe   pulmonary hypertension.  18. Pulmonary hypertension is present.  19. The main pulmonary artery is normal in size.  20. No evidence of aortic stenosis.  21. No evidence of mitral stenosis.     [id number] Mohan Fuentes MD  Electronically signed on 10/26/2016 at 5:15:14 PM          *** Final (Amended) ***    ADDENDUM:   right atrial enlargment seen  Mohan Fuentes  Electronically Amended 10/26/2016, 5:16 PM       *** Final (Amended) ***        *** END OF ADDENDUM 10/25/2016  ***       PROCEDURE DATE:  10/25/2016        INTERPRETATION:  REPORT:    TRANSTHORACIC ECHOCARDIOGRAM REPORT        ECG: VPM, 60 per minute
Infectious Diseases - Attending at Dr. Garcia    HPI:  87 woman with PMH of glaucoma, HTN, HL, paroxysmal Afib, CHF, s/p PPM presents to ED with daughter c/o RLE pain & chest discomfort.  As per daughter, patient has a right leg ulcer which had a small infection that her PMD was treating with Bactrim.  The area of redness has gotten larger since starting the Bactrim.  Patient also says that 2 days ago, her chest felt "funny" and cannot elaborate, almost as if "something was jumping".  She denies associated chest pain, SOB, lightheadedness.  She denies fever, chills, nausea, vomiting, diarrhea. (2017 04:34)      PAST MEDICAL & SURGICAL HISTORY:  Pulmonary hypertension  PAF (paroxysmal atrial fibrillation)  Glaucoma  High cholesterol  HTN (hypertension)  Artificial pacemaker      Allergies    lactose (Unknown)  No Known Drug Allergies    Intolerances        FAMILY HISTORY:  No pertinent family history in first degree relatives  No pertinent family history in first degree relatives      SOCIAL HISTORY: No smoking,alcholism    REVIEW OF SYSTEMS:    Constitutional: No fever, weight loss or fatigue  Eyes: No eye pain  ENT:  No difficulty hearing, tinnitus, vertigo; No sinus or throat pain  Neck: No pain or stiffness  Respiratory: No cough, wheezing, chills or hemoptysis  Cardiovascular: No chest pain, palpitations, shortness of breath  Gastrointestinal: No vomitting,diarrhea  Genitourinary: No dysuria  Neurological: No headaches, memory loss, loss of strength, numbness or tremors  Skin: right calf wound+      MEDICATIONS  (STANDING):  losartan 50milliGRAM(s) Oral daily  amiodarone    Tablet 200milliGRAM(s) Oral daily  hydrochlorothiazide    Capsule 12.5milliGRAM(s) Oral daily  piperacillin/tazobactam IVPB. 3.375Gram(s) IV Intermittent every 8 hours  heparin  Injectable 5000Unit(s) SubCutaneous every 8 hours  metoprolol succinate ER 25milliGRAM(s) Oral daily  artificial  tears Solution 1Drop(s) Both EYES two times a day  latanoprost 0.005% Ophthalmic Solution 1Drop(s) Right EYE at bedtime  dorzolamide 2% Ophthalmic Solution 1Drop(s) Right EYE two times a day  brimonidine 0.2% Ophthalmic Solution 1Drop(s) Right EYE two times a day  simvastatin 20milliGRAM(s) Oral at bedtime    MEDICATIONS  (PRN):  acetaminophen   Tablet 650milliGRAM(s) Oral every 6 hours PRN For Temp greater than 38 C (100.4 F) and mild pain      Vital Signs Last 24 Hrs  T(C): 36.7, Max: 36.8 ( @ 00:13)  T(F): 98, Max: 98.2 (- @ 00:13)  HR: 66 (60 - 66)  BP: 166/80 (104/52 - 166/80)  BP(mean): --  RR: 18 (16 - 18)  SpO2: 100% (98% - 100%)    PHYSICAL EXAM:    Constitutional: NAD, well-groomed, well-developed  HEENT: PERRLA, EOMI, Normal Hearing, MMM  Neck: No LAD, No JVD  Back: Normal spine flexure, No CVA tenderness  Respiratory: CTAB/L  Cardiovascular: S1 and S2, RRR, no M/G/R  Gastrointestinal: BS+, soft, NT/ND  Extremities: No peripheral edema  Vascular: 2+ peripheral pulses  Neurological: A/O x 3, no focal deficits  Skin:right calf scab + mild bloody discharge from underneath ,surrounding  induration with erythema      LABS:             WBC 5.6  cr 1.18    Urinalysis Basic - ( 2017 16:43 )    Color: Yellow / Appearance: Slightly Turbid / S.010 / pH: x  Gluc: x / Ketone: Negative  / Bili: Negative / Urobili: Negative mg/dL   Blood: x / Protein: Negative mg/dL / Nitrite: Negative   Leuk Esterase: Trace / RBC: 6-10 /HPF / WBC 0-2   Sq Epi: x / Non Sq Epi: Few / Bacteria: Few        MICROBIOLOGY:  RECENT CULTURES:        RADIOLOGY & ADDITIONAL STUDIES:  Cxray -no pneumonia
Vascular Attending:  i saw  Ms resendiz for right leg ulcer today. Acording to the daughter who is at the  bedside, pt has the ulcer for few months, came on by itself and no H/O trauma o. No H?O of DM, Chronic venous insufficiency, PAD. Pt has some dementia.       HPI:  87 woman with PMH of glaucoma, HTN, HL, paroxysmal Afib, CHF, s/p PPM presents to ED with daughter c/o RLE pain & chest discomfort.  As per daughter, patient has a right leg ulcer which had a small infection that her PMD was treating with Bactrim.  The area of redness has gotten larger since starting the Bactrim.  Patient also says that 2 days ago, her chest felt "funny" and cannot elaborate, almost as if "something was jumping".  She denies associated chest pain, SOB, lightheadedness.  She denies fever, chills, nausea, vomiting, diarrhea. (2017 04:34)      PAST MEDICAL & SURGICAL HISTORY:  Pulmonary hypertension  PAF (paroxysmal atrial fibrillation)  Glaucoma  High cholesterol  HTN (hypertension)  Artificial pacemaker        MEDICATIONS  (STANDING):  losartan 50milliGRAM(s) Oral daily  amiodarone    Tablet 200milliGRAM(s) Oral daily  simvastatin 40milliGRAM(s) Oral at bedtime  hydrochlorothiazide    Capsule 12.5milliGRAM(s) Oral daily  piperacillin/tazobactam IVPB. 3.375Gram(s) IV Intermittent every 8 hours  heparin  Injectable 5000Unit(s) SubCutaneous every 8 hours  metoprolol succinate ER 25milliGRAM(s) Oral daily  artificial  tears Solution 1Drop(s) Both EYES two times a day  latanoprost 0.005% Ophthalmic Solution 1Drop(s) Right EYE at bedtime  dorzolamide 2% Ophthalmic Solution 1Drop(s) Right EYE two times a day  brimonidine 0.2% Ophthalmic Solution 1Drop(s) Right EYE two times a day    MEDICATIONS  (PRN):  acetaminophen   Tablet 650milliGRAM(s) Oral every 6 hours PRN For Temp greater than 38 C (100.4 F) and mild pain      Allergies    lactose (Unknown)  No Known Drug Allergies    Intolerances        SOCIAL HISTORY:      Vital Signs Last 24 Hrs  T(C): 36.8, Max: 36.8 ( @ 00:13)  T(F): 98.2, Max: 98.2 ( @ 00:13)  HR: 60 (60 - 60)  BP: 164/86 (102/47 - 164/86)  BP(mean): --  RR: 16 (16 - 16)  SpO2: 100% (98% - 100%)    P/E:-  Right post calf has ulcer with the scab with purple surrounding skin discoloration and tenderness. No Chronic venous insufficiency at the ankles, foot is warm, no cellulitis and no infn. The ulcer is suspicious for inflammatory ulcer.  CAROTIDS:- Bilateral carotids with no Bruits. No scars of previous catheterisation.  UPPER EXTREMITIES:- Bilateral radial artery pulses are normal and no ischemia of the Hands. No edema of the arms.  ABDOMEN:- No pulsatile mass in the abdomen and no ascites.  LOWER EXTREMITIES:- Bilateral LE with No Edema and no CVI, No varicose veins. The arterial pulse are examined with palpation and Dopplers and the findings are as follows,    Legs:- Right calf ulcer as described above, 3x3 cms with dry eschar.  Pulses:   Right:                                                                          Left:  FEM [ ]2+ [ y]1+ [ ]doppler                                             FEM [ ]2+ [ y]1+ [ ]doppler    POP [ ]2+ [ y]1+ [ ]doppler                                             POP [ ]2+ [y ]1+ [ ]doppler    DP [ ]2+ [y ]1+ [ ]doppler                                                DP [ ]2+ [ y]1+ [ ]doppler  PT[ ]2+ [ y]1+ [ ]doppler                                                  PT [ ]2+ [ y]1+ [ ]doppler      LABS:                        11.2   5.0   )-----------( 129      ( 2017 07:07 )             34.7     04-06    145  |  108  |  22  ----------------------------<  94  4.7   |  32<H>  |  1.11    Ca    8.6      2017 07:07    TPro  6.4  /  Alb  2.5<L>  /  TBili  0.9  /  DBili  x   /  AST  27  /  ALT  27  /  AlkPhos  96  04-06    PT/INR - ( 2017 23:26 )   PT: 11.5 sec;   INR: 1.05 ratio         PTT - ( 2017 23:26 )  PTT:30.7 sec  Urinalysis Basic - ( 2017 16:43 )    Color: Yellow / Appearance: Slightly Turbid / S.010 / pH: x  Gluc: x / Ketone: Negative  / Bili: Negative / Urobili: Negative mg/dL   Blood: x / Protein: Negative mg/dL / Nitrite: Negative   Leuk Esterase: Trace / RBC: 6-10 /HPF / WBC 0-2   Sq Epi: x / Non Sq Epi: Few / Bacteria: Few        RADIOLOGY & ADDITIONAL STUDIES  EXAM:  US DPLX LWR EXT VEINS LTD RT                            PROCEDURE DATE:  2017        INTERPRETATION:    HISTORY: Right leg swelling    TECHNIQUE: Venous duplex ultrasonography was performed on the right lower   extremity.    COMPARISON: None    FINDINGS: There is complete compressibility of the right external iliac   vein, however, there is a small adherent echogenic focus along the   anterior wall of the vein possibly representing residua of chronic clot.   The common femoral, femoral and popliteal veins demonstrate normal flow   and compression. The calf veins are within normal limits. No fluid   collections are noted.    IMPRESSION:     No evidence of acute right lower extremity DVT.    Peripherally located echogenic focus within a completely compressible   right external iliac vein may represent sequela of chronic thrombus.              TYSHAWN GOMEZ M.D., ATTENDING RADIOLOGIST  This document has been electronically signed. 2017 10:56PM      Impression. Right calf ulcer chronic without infn, suspicious for inflammatory etiology.  Recommend---Collagenase locally, AKILA/PVR, ESR,RODOLFO,RA Factor, and pt can be followed in our  center, [phone], as the wound will take few weeks to months to heal.  Impression and Plan:

## 2017-04-06 NOTE — DIETITIAN INITIAL EVALUATION ADULT. - ETIOLOGY
inadequate protein-energy intake in setting of decreased po intake increased need for protein related to wound healing

## 2017-04-06 NOTE — DIETITIAN INITIAL EVALUATION ADULT. - NS AS NUTRI INTERV MEDICAL AND FOOD SUPPLEMENTS
Commercial food/Ensure pudding 4 oz 3x/day (provides 510 kcal, 12 g pro) and 1 pkg Prosource once/day (15 g pro, 60 kcal) Commercial food/Ensure pudding 4 oz 3x/day (provides 510 kcal, 12 g pro)

## 2017-04-06 NOTE — DIETITIAN INITIAL EVALUATION ADULT. - PERTINENT LABORATORY DATA
04-06 Na145 mmol/L Glu 94 mg/dL K+ 4.7 mmol/L Cr  1.11 mg/dL BUN 22 mg/dL Phos n/a   Alb 2.5 g/dL<L> PAB n/a   04-05 Chol 137 LDL 62 HDL 68 Trig 37

## 2017-04-06 NOTE — DIETITIAN INITIAL EVALUATION ADULT. - NS AS NUTRI INTERV ED CONTENT
DASH/TLC diet & high shyla/high pro nutrition therapy handout/Nutrition relationship to health/disease

## 2017-04-06 NOTE — PROGRESS NOTE ADULT - SUBJECTIVE AND OBJECTIVE BOX
CARDIOLOGY PROGRESS NOTE    Patient is a 87y Female with a known history of :  Glaucoma of both eyes, unspecified glaucoma (H40.9)  High cholesterol (E78.00)  PAF (paroxysmal atrial fibrillation) (I48.0)  Essential hypertension (I10)  Chest discomfort (R07.89)  Infected wound (T14.8)    HPI:  87 woman with PMH of HTN, HLD, paroxysmal Afib, CHF, h/o PPM presents to ED with daughter c/o RLE pain & chest discomfort.   As per daughter, patient has a right leg ulcer which had a small infection that her PMD was treating with Bactrim.  The area of redness has gotten larger since starting the Bactrim.  Patient also says that 2 days ago, her chest felt "funny" and cannot elaborate, almost as if "something was jumping" at the site of the pacemaker..  She denies associated chest pain, SOB, lightheadedness.  She denies fever, chills, nausea, vomiting, diarrhea. she apparently was being evaluated by her primary care physician for treatment of her nonhealing wound ulcer; the daughter says she had multiple cardiac tests done recently but does not know the results thereof.      REVIEW OF SYSTEMS:  CONSTITUTIONAL: No fever, weight loss, or fatigue  EYES: No eye pain, visual disturbances, or discharge  ENMT:  No difficulty hearing, tinnitus, vertigo; No sinus or throat pain  NECK: No pain or stiffness  BREASTS: No pain, masses, or nipple discharge  RESPIRATORY: No cough, wheezing, chills or hemoptysis; No shortness of breath  CARDIOVASCULAR: as per history of present illness  GASTROINTESTINAL: No abdominal or epigastric pain. No nausea, vomiting, or hematemesis; No diarrhea or constipation. No melena or hematochezia.  GENITOURINARY: No dysuria, frequency, hematuria, or incontinence  NEUROLOGICAL: No headaches, memory loss, loss of strength, numbness, or tremors  SKIN: No itching, burning, rashes, or lesions   LYMPH NODES: No enlarged glands  ENDOCRINE: No heat or cold intolerance; No hair loss  MUSCULOSKELETAL: No joint pain or swelling; No muscle, back, or extremity pain  PSYCHIATRIC: No depression, anxiety, mood swings, or difficulty sleeping  HEME/LYMPH: No easy bruising, or bleeding gums  ALLERGY AND IMMUNOLOGIC: No hives or eczema      PHYSICAL EXAMINATION:  Vital Signs Last 24 Hrs  T(C): 36.3, Max: 36.8 (04-06 @ 00:13)  T(F): 97.4, Max: 98.2 (04-06 @ 00:13)  HR: 61 (60 - 66)  BP: 148/67 (104/52 - 166/80)  RR: 18 (16 - 18)  SpO2: 98% (98% - 100%)    Constitutional: well developed, normal appearance, well groomed, well nourished, no deformities and no acute distress.   Eyes: the conjunctiva exhibited no abnormalities and the eyelids demonstrated no xanthelasmas.   HEENT: normal oral mucosa, no oral pallor and no oral cyanosis.   Neck: normal jugular venous A waves present, normal jugular venous V waves present and no jugular venous white A waves.   Pulmonary: no respiratory distress, normal respiratory rhythm and effort, no accessory muscle use and lungs were clear to auscultation bilaterally.   Cardiovascular: heart rate and rhythm were normal, normal S1 and S2 and no murmur, gallop, rub, heave or thrill are present.   Abdomen: soft, non-tender, no hepato-splenomegaly and no abdominal mass palpated.   Musculoskeletal: the gait could not be assessed..   Extremities: right lower extremity mildly edematous as compared to left lower extremity.ulceration dressed  Skin: normal skin color and pigmentation, no rash, no venous stasis, no skin lesions, no skin ulcer and no xanthoma was observed.   Psychiatric: oriented to person, place, and time, the affect was normal, the mood was normal and not feeling anxious.     LABS:   --------                        11.2   5.0   )-----------( 129      ( 06 Apr 2017 07:07 )             34.7     04-06    145  |  108  |  22  ----------------------------<  94  4.7   |  32<H>  |  1.11    Ca    8.6      06 Apr 2017 07:07    TPro  6.4  /  Alb  2.5<L>  /  TBili  0.9  /  DBili  x   /  AST  27  /  ALT  27  /  AlkPhos  96  04-06      RADIOLOGY:  -----------------      EXAM:  CHEST SINGLE VIEW                            PROCEDURE DATE:  04/05/2017        INTERPRETATION:  AP sitting chest on April 4 at 11:57 PM. Patient has   left-sided chest discomfort and day right leg wound.    Heart suggest enlargement. Left-sided pacemaker again noted.      Presently the lungs are clear. On November 6 there was a mild right base   effusion and possibly an infiltrate in the right upper lobe.    IMPRESSION: No acute lung finding. Other findings as above including   pacemaker.       EXAM:  TTE WO CON COMPLETE W DOPPL        *** ADDENDUM 10/25/2016  ***    REPORT:    TRANSTHORACIC ECHOCARDIOGRAM REPORT           Patient Name:   CHIQUIS MOLINA Patient Location: [facility] Rec #:  [id number]  Accession #:      [id number]  Account #:                Height:           62.0 in 157.5 cm  YOB: 1929    Weight:           108.0 lb 49.00 kg  Patient Age:    87 years    BSA:              1.47 m²  Patient Gender: F           BP:               113/54 mmHg        Date of Exam:10/25/2016 5:24:43 PM  Sonographer:  LARA     Indications: I50.9 Heart failure, unspecified  Diagnosis:   I34.0 Nonrheumatic mitral (valve) insufficiency           2D AND M-MODE MEASUREMENTS (normal ranges within parentheses):  Left Ventricle:       Normal    Aorta/Left Atrium:           Normal  IVSd (2D):              1.06 cm (0.7-1.1) Aortic Root (2D):  2.88 cm   (2.4-3.7)  LVPWd (2D):             0.84 cm (0.7-1.1) Left Atrium (2D):  3.50 cm   (1.9-4.0)  LVIDd (2D):             4.41 cm (3.4-5.7) Right Ventricle:  LVIDs (2D):             3.08 cm           TAPSE:           2.10 cm  LV FS (2D):             30.1 %  (>25%)  Relative Wall Thickness 0.38    (<0.42)     SPECTRAL DOPPLER ANALYSIS (where applicable):  Tricuspid Valve and PA/RV Systolic Pressure: TR Max Velocity: 4.79 m/s RA   Pressure: 5 mmHg RVSP/PASP: 96.8 mmHg        PHYSICIAN INTERPRETATION:  Left Ventricle: Normal left ventricular size and wall thicknesses, with   normal systolic and diastolic function. The left ventricular internal   cavity size is normal. Left ventricular wall thickness is normal.  Global LV systolic function was normal. Left ventricular ejection   fraction, by visual estimation, is 60 to 65%.  Right Ventricle: Normal right ventricular size andfunction.  Left Atrium: The left atrium is normal in size.  Right Atrium: The right atrium is normal in size. The right atrial   pressure is normal. The right atrium is normal in size.  Pericardium: There is no evidence of pericardial effusion. Thereis a   small pleural effusion in both left and right lateral regions.  Mitral Valve: The mitral valve is degenerative in appearance. Mitral   leaflet mobility is normal. No evidence of mitral stenosis. Mild mitral   valve regurgitation is seen.  Tricuspid Valve: Structurally normal tricuspid valve, with normal leaflet   excursion. The tricuspid valve is degenerative in appearance.   Moderate-severe tricuspid regurgitation is visualized. Estimated   pulmonary artery systolic pressure is 96.8 mmHg assuming a right atrial   pressure of 5 mmHg, which is consistent with severe pulmonary   hypertension.  Aortic Valve: No evidence of aortic stenosis. Sclerotic aortic valve with   normal opening. Mild aortic valve regurgitation is seen.  Pulmonic Valve: Structurally normal pulmonic valve, with normal leaflet   excursion. Moderate pulmonic valve regurgitation.  Aorta: The aortic root is normal in size and structure.  Pulmonary Artery: The main pulmonary artery is normal in size. Pulmonary   hypertension is present.  Additional Comments: A pacer wire is visualized in the right atrium and   right ventricle.     Summary:   1. Left ventricular ejection fraction, by visual estimation, is 60 to   65%.   2. Normal global left ventricular systolic function.   3. Normal left ventricular internal cavity size.   4. Normal left ventricular size and wall thicknesses, with normal   systolic and diastolic function.   5. Normal right ventricular size and function.   6. The left atrium is normal in size.  7. The right atrium is normal in size.   8. Degenerative mitral valve.   9. Mild mitral valve regurgitation.  10. Degenerative tricuspid valve.  11. Moderate-severe tricuspid regurgitation.  12. Structurally normal tricuspid valve, with normal leaflet excursion.  13. Mild aortic regurgitation.  14. Sclerotic aortic valve with normal opening.  15. Moderate pulmonic valve regurgitation.  16. Structurally normal pulmonic valve, with normal leaflet excursion.  17. Estimated pulmonary artery systolic pressure is 96.8 mmHg assuming a   right atrial pressure of 5 mmHg, which is consistent with severe   pulmonary hypertension.  18. Pulmonary hypertension is present.  19. The main pulmonary artery is normal in size.  20. No evidence of aortic stenosis.  21. No evidence of mitral stenosis.          ECG: VPM, 60 per minute

## 2017-04-06 NOTE — DIETITIAN INITIAL EVALUATION ADULT. - ENERGY NEEDS
Height (cm): 154.9 (04-04)  Weight (kg): 48.4 (04-05)  BMI (kg/m2): 20.2 (04-05)  Ideal Body Weight: 47.7 kg +/- 10%   % Ideal Body Weight: 102%

## 2017-04-06 NOTE — DIETITIAN INITIAL EVALUATION ADULT. - PHYSICAL APPEARANCE
BMI 20.2 (2+ edema legs) underweight/BMI 20.2 (2+ edema legs)Nutrition focused physical exam conducted ; found signs of malnutrition [ ]absent [x ]present.  Subcutaneous fat loss: [ ] Orbital fat pads region, [ ]Buccal fat region, [moderate ]Triceps region,  [moderate ]Ribs region.  Muscle wasting: [moderate ]Temples region, [severe ]Clavicle region, [severe ]Shoulder region, [severe ]Scapula region, [ ]Interosseous region,  [ ]thigh region, [ ]Calf region

## 2017-04-06 NOTE — PROGRESS NOTE ADULT - PROBLEM SELECTOR PLAN 1
Vancomycin 1 gm IVPB x 1 given in ED as well as Zosyn  Continue vanco and zosyn for now, ID consult  Send Blood Cx 2 sets.  Monitor CBC & BMP Qdaily.  Right doppler neg for acute DVT  Follow official reading XR leg  Tylenol 650mg po q6hrs PRN for fever/pain.  Wound care consult  vascularr consult appreciated inflammatory lesion emile will need outpatient work up
possible an inflammatory wound  seen by wound care surgeon some AI w/u ordered  switch to oral antibiotic (augmentin for 1 week s)  wound care per the surgeon   f/u in wound care office  d/c iv antibiotics

## 2017-04-06 NOTE — PROGRESS NOTE ADULT - SUBJECTIVE AND OBJECTIVE BOX
Patient is a 87y old  Female who presents with a chief complaint of RLE pain and redness around wound, chest discomfort (2017 04:34)      INTERVAL HPI/OVERNIGHT EVENTS: no acute events overnight resting comfortably     MEDICATIONS  (STANDING):  losartan 50milliGRAM(s) Oral daily  amiodarone    Tablet 200milliGRAM(s) Oral daily  hydrochlorothiazide    Capsule 12.5milliGRAM(s) Oral daily  piperacillin/tazobactam IVPB. 3.375Gram(s) IV Intermittent every 8 hours  heparin  Injectable 5000Unit(s) SubCutaneous every 8 hours  metoprolol succinate ER 25milliGRAM(s) Oral daily  artificial  tears Solution 1Drop(s) Both EYES two times a day  latanoprost 0.005% Ophthalmic Solution 1Drop(s) Right EYE at bedtime  dorzolamide 2% Ophthalmic Solution 1Drop(s) Right EYE two times a day  brimonidine 0.2% Ophthalmic Solution 1Drop(s) Right EYE two times a day  simvastatin 20milliGRAM(s) Oral at bedtime  collagenase Ointment 1Application(s) Topical daily    MEDICATIONS  (PRN):  acetaminophen   Tablet 650milliGRAM(s) Oral every 6 hours PRN For Temp greater than 38 C (100.4 F) and mild pain      Allergies    lactose (Unknown)  No Known Drug Allergies    Intolerances        REVIEW OF SYSTEMS:  CONSTITUTIONAL: No fever, weight loss, or fatigue  EYES: No eye pain, visual disturbances, or discharge  ENMT:  No difficulty hearing, tinnitus, vertigo; No sinus or throat pain  NECK: No pain or stiffness  BREASTS: No pain, masses, or nipple discharge  RESPIRATORY: No cough, wheezing, chills or hemoptysis; No shortness of breath  CARDIOVASCULAR: No chest pain, palpitations, dizziness, or leg swelling  GASTROINTESTINAL: No abdominal or epigastric pain. No nausea, vomiting, or hematemesis; No diarrhea or constipation. No melena or hematochezia.  GENITOURINARY: No dysuria, frequency, hematuria, or incontinence  NEUROLOGICAL: No headaches, memory loss, loss of strength, numbness, or tremors  SKIN: No itching, burning, rashes, or lesions   LYMPH NODES: No enlarged glands  ENDOCRINE: No heat or cold intolerance; No hair loss  MUSCULOSKELETAL: right leg pain and right posterior calf ulcer   PSYCHIATRIC: No depression, anxiety, mood swings, or difficulty sleeping  HEME/LYMPH: No easy bruising, or bleeding gums  ALLERGY AND IMMUNOLOGIC: No hives or eczema    Vital Signs Last 24 Hrs  T(C): 36.7, Max: 36.8 ( @ 00:13)  T(F): 98, Max: 98.2 ( @ 00:13)  HR: 66 (60 - 66)  BP: 116/64 (104/52 - 166/80)  BP(mean): --  RR: 18 (16 - 18)  SpO2: 100% (98% - 100%)    PHYSICAL EXAM:  GENERAL: NAD, well-groomed, well-developed  HEAD:  Atraumatic, Normocephalic  EYES: EOMI, PERRLA, conjunctiva and sclera clear  ENMT: No tonsillar erythema, exudates, or enlargement; Moist mucous membranes, Good dentition, No lesions  NECK: Supple, No JVD, Normal thyroid  NERVOUS SYSTEM:  Alert & Oriented X3, Good concentration; Motor Strength 5/5 B/L upper and lower extremities; DTRs 2+ intact and symmetric  CHEST/LUNG: Clear to percussion bilaterally; No rales, rhonchi, wheezing, or rubs  HEART: Regular rate and rhythm; No murmurs, rubs, or gallops  ABDOMEN: Soft, Nontender, Nondistended; Bowel sounds present  EXTREMITIES: right posterior calf ulcerr with escher mild erythema   LYMPH: No lymphadenopathy noted  SKIN: No rashes or lesions    LABS:                        11.2   5.0   )-----------( 129      ( 2017 07:07 )             34.7     -    145  |  108  |  22  ----------------------------<  94  4.7   |  32<H>  |  1.11    Ca    8.6      2017 07:07    TPro  6.4  /  Alb  2.5<L>  /  TBili  0.9  /  DBili  x   /  AST  27  /  ALT  27  /  AlkPhos  96  04-    PT/INR - ( 2017 23:26 )   PT: 11.5 sec;   INR: 1.05 ratio         PTT - ( 2017 23:26 )  PTT:30.7 sec  Urinalysis Basic - ( 2017 16:43 )    Color: Yellow / Appearance: Slightly Turbid / S.010 / pH: x  Gluc: x / Ketone: Negative  / Bili: Negative / Urobili: Negative mg/dL   Blood: x / Protein: Negative mg/dL / Nitrite: Negative   Leuk Esterase: Trace / RBC: 6-10 /HPF / WBC 0-2   Sq Epi: x / Non Sq Epi: Few / Bacteria: Few      CAPILLARY BLOOD GLUCOSE      RADIOLOGY & ADDITIONAL TESTS:    Imaging Personally Reviewed:  [ ] YES  [ ] NO    Consultant(s) Notes Reviewed:  [ ] YES  [ ] NO    Care Discussed with Consultants/Other Providers [ ] YES  [ ] NO

## 2017-04-06 NOTE — DIETITIAN INITIAL EVALUATION ADULT. - OTHER INFO
Pt seen for consult due to qs about prescribed diet. pt lives alone, has a HHA, and supportive daughter, son. Son does grocery shopping, HHA does the cooking. she follows therapeutic diet at home for CHF and high chol. pt reports she is on a 2L fluid restriction. no reports of N/V/D/C, last BM 4/05. Pt has dentures; she asked for meat to be chopped, otherwise no difficulty chewing/swallowing. as per daughter, pt lost 5-8 lbs 2 wks PTA due to decreased appetite, then gained wt due to fluid, and is eating well in hosp now. Pt seen for consult due to qs about prescribed diet. pt lives alone, has a HHA, and supportive daughter, son. Son does grocery shopping, HHA does the cooking. she follows therapeutic diet at home for CHF and high chol. pt reports she is on a 2L fluid restriction. no reports of N/V/D/C, last BM 4/05. Pt has dentures; she asked for meat to be chopped, otherwise no difficulty chewing/swallowing. as per daughter, pt lost about 8 lbs 2 wks PTA due to decreased appetite, then gained wt due to fluid, and is eating well in hosp now. Pt seen for consult due to qs about prescribed diet. pt lives alone, has a HHA, and supportive daughter, son. Son does grocery shopping, HHA does the cooking. she follows therapeutic diet at home for CHF and high chol. pt reports she is on a 2L fluid restriction. pt is lactose intolerant. no reports of N/V/D/C, last BM 4/05. Pt has dentures; she asked for meat to be chopped, otherwise no difficulty chewing/swallowing. as per daughter, pt lost about 8 lbs 2 wks PTA due to decreased appetite, then gained wt due to fluid, and is eating well in hosp now.

## 2017-04-06 NOTE — PROGRESS NOTE ADULT - ASSESSMENT
87 woman with PMH of glaucoma, HTN, HL, paroxysmal Afib, CHF, s/p PPM presents to ED with daughter c/o RLE pain & chest discomfort.  Patient failed outpatient PO treatment of her RLE Cellulitis and will require admission of at least 2 midnights for further treatment and mgmt as detailed below.  Now no further chest pain wound evaluated plan to dc 4/7/17 with out patient follow up

## 2017-04-06 NOTE — PROGRESS NOTE ADULT - ASSESSMENT
87-year-old female admitted with nonhealing wound ulcer and atypical complaints of "A wave "over her pacemaker. Recent echocardiogram shows normal left ventricular systolic function with evidence of severe pulmonary hypertension and tricuspid insufficiency. This is the most likely source of her lower extremity edema. Will try to obtain records of recent nuclear stress testing done by primary cardiologist. Continue wound care.  Con't with:  MEDICATIONS  (STANDING):  losartan 50milliGRAM(s) Oral daily  amiodarone    Tablet 200milliGRAM(s) Oral daily  hydrochlorothiazide    Capsule 12.5milliGRAM(s) Oral daily  piperacillin/tazobactam IVPB. 3.375Gram(s) IV Intermittent every 8 hours  heparin  Injectable 5000Unit(s) SubCutaneous every 8 hours  metoprolol succinate ER 25milliGRAM(s) Oral daily  artificial  tears Solution 1Drop(s) Both EYES two times a day  latanoprost 0.005% Ophthalmic Solution 1Drop(s) Right EYE at bedtime  dorzolamide 2% Ophthalmic Solution 1Drop(s) Right EYE two times a day  brimonidine 0.2% Ophthalmic Solution 1Drop(s) Right EYE two times a day  simvastatin 20milliGRAM(s) Oral at bedtime  collagenase Ointment 1Application(s) Topical daily

## 2017-04-06 NOTE — PROGRESS NOTE ADULT - SUBJECTIVE AND OBJECTIVE BOX
Patient is a 87y old  Female who presents with a chief complaint of RLE pain and redness around wound, chest discomfort (2017 04:34)      INTERVAL HPI / OVERNIGHT EVENTS:no fever    MEDICATIONS  (STANDING):  losartan 50milliGRAM(s) Oral daily  amiodarone    Tablet 200milliGRAM(s) Oral daily  hydrochlorothiazide    Capsule 12.5milliGRAM(s) Oral daily  piperacillin/tazobactam IVPB. 3.375Gram(s) IV Intermittent every 8 hours  heparin  Injectable 5000Unit(s) SubCutaneous every 8 hours  metoprolol succinate ER 25milliGRAM(s) Oral daily  artificial  tears Solution 1Drop(s) Both EYES two times a day  latanoprost 0.005% Ophthalmic Solution 1Drop(s) Right EYE at bedtime  dorzolamide 2% Ophthalmic Solution 1Drop(s) Right EYE two times a day  brimonidine 0.2% Ophthalmic Solution 1Drop(s) Right EYE two times a day  simvastatin 20milliGRAM(s) Oral at bedtime  collagenase Ointment 1Application(s) Topical daily    MEDICATIONS  (PRN):  acetaminophen   Tablet 650milliGRAM(s) Oral every 6 hours PRN For Temp greater than 38 C (100.4 F) and mild pain      Vital Signs Last 24 Hrs  T(C): 36.3, Max: 36.8 (- @ 00:13)  T(F): 97.4, Max: 98.2 (04- @ 00:13)  HR: 61 (60 - 66)  BP: 148/67 (116/64 - 166/80)  BP(mean): --  RR: 18 (16 - 18)  SpO2: 98% (98% - 100%)    PHYSICAL EXAM:    Constitutional: NAD, well-groomed, well-developed  Respiratory: CTAB/L  Cardiovascular: S1 and S2, RRR, no M/G/R  Gastrointestinal: BS+, soft, NT/ND  Extremities: No peripheral edema  Vascular: 2+ peripheral pulses  Skin: right calf wound with tenderness around the scab,mild erythema though not warm around the scab      LABS:                        11.2   5.0   )-----------( 129      ( 2017 07:07 )             34.7     04-06    145  |  108  |  22  ----------------------------<  94  4.7   |  32<H>  |  1.11    Ca    8.6      2017 07:07    TPro  6.4  /  Alb  2.5<L>  /  TBili  0.9  /  DBili  x   /  AST  27  /  ALT  27  /  AlkPhos  96  04-06    PT/INR - ( 2017 23:26 )   PT: 11.5 sec;   INR: 1.05 ratio         PTT - ( 2017 23:26 )  PTT:30.7 sec  Urinalysis Basic - ( 2017 16:43 )    Color: Yellow / Appearance: Slightly Turbid / S.010 / pH: x  Gluc: x / Ketone: Negative  / Bili: Negative / Urobili: Negative mg/dL   Blood: x / Protein: Negative mg/dL / Nitrite: Negative   Leuk Esterase: Trace / RBC: 6-10 /HPF / WBC 0-2   Sq Epi: x / Non Sq Epi: Few / Bacteria: Few          MICROBIOLOGY:  RECENT CULTURES:   .Blood Blood-Venous XXXX XXXX   No growth to date.          RADIOLOGY & ADDITIONAL STUDIES:

## 2017-04-06 NOTE — PROGRESS NOTE ADULT - SUBJECTIVE AND OBJECTIVE BOX
Per telephone discussion with Dr. Morgan, pt can be discharged to home with daily wound care.  Needs to make appointment for wound care clinic for f/u in about a week.

## 2017-04-06 NOTE — PROGRESS NOTE ADULT - PROBLEM SELECTOR PLAN 2
Symptoms sound like palpitations  Telemetry monitoring  Follow CXR  Will trend troponin q 8h  PPM interrogated in February evaluated by cardiology appreciated
stable.as above

## 2017-04-07 VITALS
HEART RATE: 59 BPM | TEMPERATURE: 99 F | SYSTOLIC BLOOD PRESSURE: 148 MMHG | DIASTOLIC BLOOD PRESSURE: 63 MMHG | OXYGEN SATURATION: 99 % | RESPIRATION RATE: 16 BRPM

## 2017-04-07 LAB — ANA TITR SER: NEGATIVE — SIGNIFICANT CHANGE UP

## 2017-04-07 PROCEDURE — 99239 HOSP IP/OBS DSCHRG MGMT >30: CPT

## 2017-04-07 PROCEDURE — 99232 SBSQ HOSP IP/OBS MODERATE 35: CPT

## 2017-04-07 RX ORDER — METOPROLOL TARTRATE 50 MG
50 TABLET ORAL DAILY
Qty: 0 | Refills: 0 | Status: DISCONTINUED | OUTPATIENT
Start: 2017-04-07 | End: 2017-04-07

## 2017-04-07 RX ORDER — COLLAGENASE CLOSTRIDIUM HIST. 250 UNIT/G
1 OINTMENT (GRAM) TOPICAL
Qty: 2 | Refills: 3 | OUTPATIENT
Start: 2017-04-07 | End: 2017-08-04

## 2017-04-07 RX ADMIN — Medication 650 MILLIGRAM(S): at 11:36

## 2017-04-07 RX ADMIN — DORZOLAMIDE HYDROCHLORIDE 1 DROP(S): 20 SOLUTION/ DROPS OPHTHALMIC at 17:00

## 2017-04-07 RX ADMIN — HEPARIN SODIUM 5000 UNIT(S): 5000 INJECTION INTRAVENOUS; SUBCUTANEOUS at 06:24

## 2017-04-07 RX ADMIN — DORZOLAMIDE HYDROCHLORIDE 1 DROP(S): 20 SOLUTION/ DROPS OPHTHALMIC at 06:22

## 2017-04-07 RX ADMIN — AMIODARONE HYDROCHLORIDE 200 MILLIGRAM(S): 400 TABLET ORAL at 06:28

## 2017-04-07 RX ADMIN — Medication 1 TABLET(S): at 06:24

## 2017-04-07 RX ADMIN — Medication 1 APPLICATION(S): at 11:37

## 2017-04-07 RX ADMIN — Medication 1 TABLET(S): at 17:01

## 2017-04-07 RX ADMIN — Medication 1 DROP(S): at 17:00

## 2017-04-07 RX ADMIN — Medication 25 MILLIGRAM(S): at 06:30

## 2017-04-07 RX ADMIN — HEPARIN SODIUM 5000 UNIT(S): 5000 INJECTION INTRAVENOUS; SUBCUTANEOUS at 13:24

## 2017-04-07 RX ADMIN — LOSARTAN POTASSIUM 50 MILLIGRAM(S): 100 TABLET, FILM COATED ORAL at 06:29

## 2017-04-07 RX ADMIN — BRIMONIDINE TARTRATE 1 DROP(S): 2 SOLUTION/ DROPS OPHTHALMIC at 17:00

## 2017-04-07 RX ADMIN — BRIMONIDINE TARTRATE 1 DROP(S): 2 SOLUTION/ DROPS OPHTHALMIC at 06:27

## 2017-04-07 RX ADMIN — Medication 1 DROP(S): at 06:28

## 2017-04-07 NOTE — DISCHARGE NOTE ADULT - MEDICATION SUMMARY - MEDICATIONS TO TAKE
I will START or STAY ON the medications listed below when I get home from the hospital:    losartan 50 mg oral tablet  -- 1 tab(s) by mouth once a day  -- Indication: For Heart     amiodarone  --  by mouth   -- Indication: For Heart     simvastatin 40 mg oral tablet  -- 1 tab(s) by mouth once a day (at bedtime)  -- Indication: For Cholesterol     metoprolol succinate 25 mg oral tablet, extended release  -- 1 tab(s) by mouth once a day  -- Indication: For blood pressure     collagenase 250 units/g topical ointment  -- 1 application on skin once a day  -- Indication: For wound     chlorthalidone 25 mg oral tablet  -- 1 tab(s) by mouth once a day  -- Indication: For blood pressure     famotidine 40 mg oral tablet  -- 1 tab(s) by mouth once a day (at bedtime)  -- Indication: For stomach     fluticasone 50 mcg/inh nasal spray  -- 2 spray(s) into nose once a day  -- Indication: For allergy    brimonidine 0.2% ophthalmic solution  -- 1  to each affected eye   -- Indication: For Eyes    dorzolamide 2% ophthalmic solution  --  to each affected eye   -- Indication: For Eyes    latanoprost 0.005% ophthalmic solution  -- 1 drop(s) to each affected eye once a day (in the evening)  -- Indication: For Eyes    Artificial Tears ophthalmic solution  -- 1 drop(s) to each affected eye 2 times a day  -- Indication: For Eyes     amoxicillin-clavulanate 500 mg-125 mg oral tablet  -- 1 tab(s) by mouth 2 times a day  -- Indication: For right leg wound

## 2017-04-07 NOTE — PROGRESS NOTE ADULT - SUBJECTIVE AND OBJECTIVE BOX
CARDIOLOGY PROGRESS NOTE    Patient is a 87y Female with a known history of :  Glaucoma of both eyes, unspecified glaucoma (H40.9)  High cholesterol (E78.00)  PAF (paroxysmal atrial fibrillation) (I48.0)  Essential hypertension (I10)  Chest discomfort (R07.89)  Infected wound (T14.8)    HPI:  87 woman with PMH of HTN, HLD, paroxysmal Afib, CHF, h/o PPM presents to ED with daughter c/o RLE pain & chest discomfort.   As per daughter, patient has a right leg ulcer which had a small infection that her PMD was treating with Bactrim.  The area of redness has gotten larger since starting the Bactrim.  Patient also says that 2 days ago, her chest felt "funny" and cannot elaborate, almost as if "something was jumping" at the site of the pacemaker..  Similar episode yesterday while waiting in radiology.  No chest pain, lightheadedness.  She denies fever, chills, nausea, vomiting, diarrhea. The daughter says she had multiple cardiac tests done recently but does not know the results thereof.      PHYSICAL EXAMINATION:  -----------------------------  T(C): 36.6, Max: 36.7 (04-06 @ 11:23)  HR: 64 (60 - 66)  BP: 137/66 (116/64 - 167/74)  RR: 18 (16 - 18)  SpO2: 98% (98% - 100%)  Wt(kg): --    I & Os for current day (as of 04-07 @ 08:56)  =============================================  IN:    Oral Fluid: 750 ml    Total IN: 750 ml  ---------------------------------------------  OUT:    Total OUT: 0 ml  ---------------------------------------------  Total NET: 750 ml        Constitutional: well developed, normal appearance, well groomed, well nourished, no deformities and no acute distress.   Eyes: the conjunctiva exhibited no abnormalities and the eyelids demonstrated no xanthelasmas.   HEENT: normal oral mucosa, no oral pallor and no oral cyanosis.   Neck: normal jugular venous A waves present, normal jugular venous V waves present and no jugular venous white A waves.   Pulmonary: no respiratory distress, normal respiratory rhythm and effort, no accessory muscle use and lungs were clear to auscultation bilaterally.   Cardiovascular: heart rate and rhythm were normal, normal S1 and S2 and no murmur, gallop, rub, heave or thrill are present.   Abdomen: soft, non-tender, no hepato-splenomegaly and no abdominal mass palpated.   Musculoskeletal: the gait could not be assessed..   Extremities: dressing at LLE. No edema noted   Skin: normal skin color and pigmentation, no rash, no venous stasis, no skin lesions, no skin ulcer and no xanthoma was observed.   Psychiatric: oriented to person, place, and time, the affect was normal, the mood was normal and not feeling anxious.     LABS:   --------  04-06    145  |  108  |  22  ----------------------------<  94  4.7   |  32<H>  |  1.11    Ca    8.6      06 Apr 2017 07:07    TPro  6.4  /  Alb  2.5<L>  /  TBili  0.9  /  DBili  x   /  AST  27  /  ALT  27  /  AlkPhos  96  04-06                         11.2   5.0   )-----------( 129      ( 06 Apr 2017 07:07 )             34.7         04-05 @ 15:45 CPK total:--, CKMB --, Troponin I - .037 ng/mL  04-05 @ 10:26 CPK total:--, CKMB --, Troponin I - .038 ng/mL    137 mg/dL, 62 mg/dL, 68 mg/dL, 37 mg/dL    Culture Results:   No growth to date. (04-05 @ 12:33)  Culture Results:   No growth to date. (04-05 @ 12:33)      LABS:   --------                        11.2   5.0   )-----------( 129      ( 06 Apr 2017 07:07 )             34.7     04-06    145  |  108  |  22  ----------------------------<  94  4.7   |  32<H>  |  1.11    Ca    8.6      06 Apr 2017 07:07    TPro  6.4  /  Alb  2.5<L>  /  TBili  0.9  /  DBili  x   /  AST  27  /  ALT  27  /  AlkPhos  96  04-06      RADIOLOGY:  -----------------      EXAM:  CHEST SINGLE VIEW                            PROCEDURE DATE:  04/05/2017        INTERPRETATION:  AP sitting chest on April 4 at 11:57 PM. Patient has   left-sided chest discomfort and day right leg wound.    Heart suggest enlargement. Left-sided pacemaker again noted.      Presently the lungs are clear. On November 6 there was a mild right base   effusion and possibly an infiltrate in the right upper lobe.    IMPRESSION: No acute lung finding. Other findings as above including   pacemaker.       EXAM:  TTE WO CON COMPLETE W DOPPL        *** ADDENDUM 10/25/2016  ***    REPORT:    TRANSTHORACIC ECHOCARDIOGRAM REPORT           Patient Name:   CHIQUIS MOLINA Patient Location: [facility] Rec #:  [id number]  Accession #:      [id number]  Account #:                Height:           62.0 in 157.5 cm  YOB: 1929    Weight:           108.0 lb 49.00 kg  Patient Age:    87 years    BSA:              1.47 m²  Patient Gender: F           BP:               113/54 mmHg        Date of Exam:10/25/2016 5:24:43 PM  Sonographer:  LARA     Indications: I50.9 Heart failure, unspecified  Diagnosis:   I34.0 Nonrheumatic mitral (valve) insufficiency           2D AND M-MODE MEASUREMENTS (normal ranges within parentheses):  Left Ventricle:       Normal    Aorta/Left Atrium:           Normal  IVSd (2D):              1.06 cm (0.7-1.1) Aortic Root (2D):  2.88 cm   (2.4-3.7)  LVPWd (2D):             0.84 cm (0.7-1.1) Left Atrium (2D):  3.50 cm   (1.9-4.0)  LVIDd (2D):             4.41 cm (3.4-5.7) Right Ventricle:  LVIDs (2D):             3.08 cm           TAPSE:           2.10 cm  LV FS (2D):             30.1 %  (>25%)  Relative Wall Thickness 0.38    (<0.42)     SPECTRAL DOPPLER ANALYSIS (where applicable):  Tricuspid Valve and PA/RV Systolic Pressure: TR Max Velocity: 4.79 m/s RA   Pressure: 5 mmHg RVSP/PASP: 96.8 mmHg        PHYSICIAN INTERPRETATION:  Left Ventricle: Normal left ventricular size and wall thicknesses, with   normal systolic and diastolic function. The left ventricular internal   cavity size is normal. Left ventricular wall thickness is normal.  Global LV systolic function was normal. Left ventricular ejection   fraction, by visual estimation, is 60 to 65%.  Right Ventricle: Normal right ventricular size andfunction.  Left Atrium: The left atrium is normal in size.  Right Atrium: The right atrium is normal in size. The right atrial   pressure is normal. The right atrium is normal in size.  Pericardium: There is no evidence of pericardial effusion. Thereis a   small pleural effusion in both left and right lateral regions.  Mitral Valve: The mitral valve is degenerative in appearance. Mitral   leaflet mobility is normal. No evidence of mitral stenosis. Mild mitral   valve regurgitation is seen.  Tricuspid Valve: Structurally normal tricuspid valve, with normal leaflet   excursion. The tricuspid valve is degenerative in appearance.   Moderate-severe tricuspid regurgitation is visualized. Estimated   pulmonary artery systolic pressure is 96.8 mmHg assuming a right atrial   pressure of 5 mmHg, which is consistent with severe pulmonary   hypertension.  Aortic Valve: No evidence of aortic stenosis. Sclerotic aortic valve with   normal opening. Mild aortic valve regurgitation is seen.  Pulmonic Valve: Structurally normal pulmonic valve, with normal leaflet   excursion. Moderate pulmonic valve regurgitation.  Aorta: The aortic root is normal in size and structure.  Pulmonary Artery: The main pulmonary artery is normal in size. Pulmonary   hypertension is present.  Additional Comments: A pacer wire is visualized in the right atrium and   right ventricle.     Summary:   1. Left ventricular ejection fraction, by visual estimation, is 60 to   65%.   2. Normal global left ventricular systolic function.   3. Normal left ventricular internal cavity size.   4. Normal left ventricular size and wall thicknesses, with normal   systolic and diastolic function.   5. Normal right ventricular size and function.   6. The left atrium is normal in size.  7. The right atrium is normal in size.   8. Degenerative mitral valve.   9. Mild mitral valve regurgitation.  10. Degenerative tricuspid valve.  11. Moderate-severe tricuspid regurgitation.  12. Structurally normal tricuspid valve, with normal leaflet excursion.  13. Mild aortic regurgitation.  14. Sclerotic aortic valve with normal opening.  15. Moderate pulmonic valve regurgitation.  16. Structurally normal pulmonic valve, with normal leaflet excursion.  17. Estimated pulmonary artery systolic pressure is 96.8 mmHg assuming a   right atrial pressure of 5 mmHg, which is consistent with severe   pulmonary hypertension.  18. Pulmonary hypertension is present.  19. The main pulmonary artery is normal in size.  20. No evidence of aortic stenosis.  21. No evidence of mitral stenosis.          ECG: VPM, 60 per minute

## 2017-04-07 NOTE — DISCHARGE NOTE ADULT - PLAN OF CARE
patient to follow up with wound care center patient to follow up with wound care and her PMD PPM check completed resolved

## 2017-04-07 NOTE — DISCHARGE NOTE ADULT - PATIENT PORTAL LINK FT
“You can access the [facility] Patient Portal, offered by [facility], by registering with the following website: [email or web address]”

## 2017-04-07 NOTE — DISCHARGE NOTE ADULT - CARE PROVIDER_API CALL
Jose Armando Morgan), Surgery  [address] [place] [address]  Phone: [phone]  Fax: [phone]

## 2017-04-07 NOTE — PROGRESS NOTE ADULT - ASSESSMENT
87-year-old female admitted with nonhealing wound ulcer and atypical complaints of "a wave "over her pacemaker yesterday.   Recent echocardiogram shows normal left ventricular systolic function with evidence of severe pulmonary hypertension and tricuspid insufficiency. This is the most likely source of her lower extremity edema. Not able to obtain records of recent nuclear stress testing done by primary cardiologist. Continue wound care. Will get [facility] PPM interrogated prior to discharge, continue cardiac care with primary cardiologist  Elevated BP's noted, titrate bbl for BP and rate control.

## 2017-04-07 NOTE — DISCHARGE NOTE ADULT - HOSPITAL COURSE
87 woman with PMH of glaucoma, HTN, HL, paroxysmal Afib, CHF, s/p PPM presents to ED with daughter c/o RLE pain & chest discomfort.  As per daughter, patient has a right leg ulcer which had a small infection that her PMD was treating with Bactrim.  The area of redness has gotten larger since starting the Bactrim.  Patient also says that 2 days ago, her chest felt "funny" and cannot elaborate, almost as if "something was jumping".  She denies associated chest pain, SOB, lightheadedness.  She denies fever, chills, nausea, vomiting, diarrhea  Problem/Plan - 1:  ·  Problem: Infected wound.  Plan: Vancomycin 1 gm IVPB x 1 given in ED as well as Zosyn  Continue vanco and zosyn for now, ID consult  Send Blood Cx 2 sets negative during hospitalization   Right doppler neg for acute DVT  Wound care consult complete   As per daughter, patient's PMD  Problem/Plan - 2:  ·  Problem: Chest discomfort.  Plan: Symptoms sound like palpitations  Telemetry monitoring negative   Cardiology consult completed , PPM interrogation done      Problem/Plan - 3:  ·  Problem: Essential hypertension stable     Problem/Plan - 4:  ·  Problem: PAF (paroxysmal atrial fibrillation).  Plan: As per daughter, on amiodarone.     Problem/Plan - 5:  ·  Problem: High cholesterol.  Plan: Continue Zocor 40 mg PO QHS.     Problem/Plan - 6:  Problem: Glaucoma of both eyes, unspecified glaucoma. Plan: Continue drops from home.    Problem/Plan - 7:  ·  Problem: Preventive measure.  Plan: DVT Prophylaxis with Heparin 5000units sc q8hrs.    Vascular evaluation   Impression. 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  Recommend---Collagenase locally, AKILA/PVR, ESR,RODOLFO,RA Factor, and pt can be followed in our  center, [phone], as the wound will take few weeks to months to heal.    Patient was discharged with follow up in our wound care center and for follow up with her PMD

## 2017-04-07 NOTE — DISCHARGE NOTE ADULT - CARE PLAN
Principal Discharge DX:	Infected wound  Goal:	patient to follow up with wound care center  Instructions for follow-up, activity and diet:	patient to follow up with wound care and her PMD  Secondary Diagnosis:	Artificial pacemaker  Goal:	PPM check completed  Secondary Diagnosis:	Chest discomfort  Goal:	resolved

## 2017-04-07 NOTE — DISCHARGE NOTE ADULT - INSTRUCTIONS
no diet or fluid restrictions right leg wound clean the wound and apply collagenase daily. keep wound clean and dry

## 2017-04-07 NOTE — DISCHARGE NOTE ADULT - MEDICATION SUMMARY - MEDICATIONS TO STOP TAKING
I will STOP taking the medications listed below when I get home from the hospital:    linezolid 600 mg oral tablet  -- 1 tab(s) by mouth every 12 hours for  3  days

## 2017-04-09 RX ORDER — COLLAGENASE CLOSTRIDIUM HIST. 250 UNIT/G
1 OINTMENT (GRAM) TOPICAL
Qty: 2 | Refills: 0 | OUTPATIENT
Start: 2017-04-09 | End: 2017-05-09

## 2017-04-10 LAB
CULTURE RESULTS: SIGNIFICANT CHANGE UP
CULTURE RESULTS: SIGNIFICANT CHANGE UP
SPECIMEN SOURCE: SIGNIFICANT CHANGE UP
SPECIMEN SOURCE: SIGNIFICANT CHANGE UP

## 2017-04-17 ENCOUNTER — OUTPATIENT (OUTPATIENT)
Dept: OUTPATIENT SERVICES | Facility: HOSPITAL | Age: 82
LOS: 1 days | Discharge: HOME | End: 2017-04-17

## 2017-04-17 ENCOUNTER — APPOINTMENT (OUTPATIENT)
Dept: WOUND CARE | Facility: HOSPITAL | Age: 82
End: 2017-04-17

## 2017-04-17 DIAGNOSIS — Z95.0 PRESENCE OF CARDIAC PACEMAKER: Chronic | ICD-10-CM

## 2017-04-17 DIAGNOSIS — L89.90 PRESSURE ULCER OF UNSPECIFIED SITE, UNSPECIFIED STAGE: ICD-10-CM

## 2017-04-20 DIAGNOSIS — Z83.3 FAMILY HISTORY OF DIABETES MELLITUS: ICD-10-CM

## 2017-04-20 DIAGNOSIS — D64.9 ANEMIA, UNSPECIFIED: ICD-10-CM

## 2017-04-20 DIAGNOSIS — H40.9 UNSPECIFIED GLAUCOMA: ICD-10-CM

## 2017-04-20 DIAGNOSIS — J32.9 CHRONIC SINUSITIS, UNSPECIFIED: ICD-10-CM

## 2017-04-20 DIAGNOSIS — E73.9 LACTOSE INTOLERANCE, UNSPECIFIED: ICD-10-CM

## 2017-04-20 DIAGNOSIS — I49.9 CARDIAC ARRHYTHMIA, UNSPECIFIED: ICD-10-CM

## 2017-04-20 DIAGNOSIS — Z74.1 NEED FOR ASSISTANCE WITH PERSONAL CARE: ICD-10-CM

## 2017-04-20 DIAGNOSIS — Z79.899 OTHER LONG TERM (CURRENT) DRUG THERAPY: ICD-10-CM

## 2017-04-20 DIAGNOSIS — L97.812 NON-PRESSURE CHRONIC ULCER OF OTHER PART OF RIGHT LOWER LEG WITH FAT LAYER EXPOSED: ICD-10-CM

## 2017-04-20 DIAGNOSIS — Z80.9 FAMILY HISTORY OF MALIGNANT NEOPLASM, UNSPECIFIED: ICD-10-CM

## 2017-04-20 DIAGNOSIS — R60.9 EDEMA, UNSPECIFIED: ICD-10-CM

## 2017-04-20 DIAGNOSIS — M79.604 PAIN IN RIGHT LEG: ICD-10-CM

## 2017-04-20 DIAGNOSIS — L89.90 PRESSURE ULCER OF UNSPECIFIED SITE, UNSPECIFIED STAGE: ICD-10-CM

## 2017-04-20 DIAGNOSIS — Z95.0 PRESENCE OF CARDIAC PACEMAKER: ICD-10-CM

## 2017-04-20 DIAGNOSIS — I10 ESSENTIAL (PRIMARY) HYPERTENSION: ICD-10-CM

## 2017-04-20 DIAGNOSIS — G47.30 SLEEP APNEA, UNSPECIFIED: ICD-10-CM

## 2017-04-20 DIAGNOSIS — R53.83 OTHER FATIGUE: ICD-10-CM

## 2017-04-20 DIAGNOSIS — Z82.3 FAMILY HISTORY OF STROKE: ICD-10-CM

## 2017-04-25 ENCOUNTER — RESULT REVIEW (OUTPATIENT)
Age: 82
End: 2017-04-25

## 2017-04-26 ENCOUNTER — OUTPATIENT (OUTPATIENT)
Dept: OUTPATIENT SERVICES | Facility: HOSPITAL | Age: 82
LOS: 1 days | Discharge: HOME | End: 2017-04-26
Payer: MEDICARE

## 2017-04-26 DIAGNOSIS — L89.90 PRESSURE ULCER OF UNSPECIFIED SITE, UNSPECIFIED STAGE: ICD-10-CM

## 2017-04-26 DIAGNOSIS — Z95.0 PRESENCE OF CARDIAC PACEMAKER: Chronic | ICD-10-CM

## 2017-04-26 PROCEDURE — 88304 TISSUE EXAM BY PATHOLOGIST: CPT | Mod: 26

## 2017-04-26 PROCEDURE — 88305 TISSUE EXAM BY PATHOLOGIST: CPT | Mod: 26

## 2017-04-27 ENCOUNTER — RESULT REVIEW (OUTPATIENT)
Age: 82
End: 2017-04-27

## 2017-05-03 ENCOUNTER — OUTPATIENT (OUTPATIENT)
Dept: OUTPATIENT SERVICES | Facility: HOSPITAL | Age: 82
LOS: 1 days | Discharge: HOME | End: 2017-05-03

## 2017-05-03 ENCOUNTER — APPOINTMENT (OUTPATIENT)
Dept: WOUND CARE | Facility: HOSPITAL | Age: 82
End: 2017-05-03

## 2017-05-03 DIAGNOSIS — H40.9 UNSPECIFIED GLAUCOMA: ICD-10-CM

## 2017-05-03 DIAGNOSIS — G47.30 SLEEP APNEA, UNSPECIFIED: ICD-10-CM

## 2017-05-03 DIAGNOSIS — L97.812 NON-PRESSURE CHRONIC ULCER OF OTHER PART OF RIGHT LOWER LEG WITH FAT LAYER EXPOSED: ICD-10-CM

## 2017-05-03 DIAGNOSIS — M79.604 PAIN IN RIGHT LEG: ICD-10-CM

## 2017-05-03 DIAGNOSIS — I49.9 CARDIAC ARRHYTHMIA, UNSPECIFIED: ICD-10-CM

## 2017-05-03 DIAGNOSIS — L89.90 PRESSURE ULCER OF UNSPECIFIED SITE, UNSPECIFIED STAGE: ICD-10-CM

## 2017-05-03 DIAGNOSIS — E73.9 LACTOSE INTOLERANCE, UNSPECIFIED: ICD-10-CM

## 2017-05-03 DIAGNOSIS — I10 ESSENTIAL (PRIMARY) HYPERTENSION: ICD-10-CM

## 2017-05-03 DIAGNOSIS — J32.9 CHRONIC SINUSITIS, UNSPECIFIED: ICD-10-CM

## 2017-05-03 DIAGNOSIS — D64.9 ANEMIA, UNSPECIFIED: ICD-10-CM

## 2017-05-03 DIAGNOSIS — Z74.1 NEED FOR ASSISTANCE WITH PERSONAL CARE: ICD-10-CM

## 2017-05-03 DIAGNOSIS — Z95.0 PRESENCE OF CARDIAC PACEMAKER: Chronic | ICD-10-CM

## 2017-05-03 DIAGNOSIS — I50.22 CHRONIC SYSTOLIC (CONGESTIVE) HEART FAILURE: ICD-10-CM

## 2017-05-03 DIAGNOSIS — R60.9 EDEMA, UNSPECIFIED: ICD-10-CM

## 2017-05-03 DIAGNOSIS — Z79.899 OTHER LONG TERM (CURRENT) DRUG THERAPY: ICD-10-CM

## 2017-05-10 ENCOUNTER — APPOINTMENT (OUTPATIENT)
Dept: WOUND CARE | Facility: HOSPITAL | Age: 82
End: 2017-05-10

## 2017-05-10 ENCOUNTER — OUTPATIENT (OUTPATIENT)
Dept: OUTPATIENT SERVICES | Facility: HOSPITAL | Age: 82
LOS: 1 days | Discharge: HOME | End: 2017-05-10

## 2017-05-10 DIAGNOSIS — Z79.899 OTHER LONG TERM (CURRENT) DRUG THERAPY: ICD-10-CM

## 2017-05-10 DIAGNOSIS — L89.90 PRESSURE ULCER OF UNSPECIFIED SITE, UNSPECIFIED STAGE: ICD-10-CM

## 2017-05-10 DIAGNOSIS — I10 ESSENTIAL (PRIMARY) HYPERTENSION: ICD-10-CM

## 2017-05-10 DIAGNOSIS — Z74.1 NEED FOR ASSISTANCE WITH PERSONAL CARE: ICD-10-CM

## 2017-05-10 DIAGNOSIS — L97.812 NON-PRESSURE CHRONIC ULCER OF OTHER PART OF RIGHT LOWER LEG WITH FAT LAYER EXPOSED: ICD-10-CM

## 2017-05-10 DIAGNOSIS — I50.22 CHRONIC SYSTOLIC (CONGESTIVE) HEART FAILURE: ICD-10-CM

## 2017-05-10 DIAGNOSIS — M79.604 PAIN IN RIGHT LEG: ICD-10-CM

## 2017-05-10 DIAGNOSIS — Z95.0 PRESENCE OF CARDIAC PACEMAKER: Chronic | ICD-10-CM

## 2017-05-10 DIAGNOSIS — G47.30 SLEEP APNEA, UNSPECIFIED: ICD-10-CM

## 2017-05-10 DIAGNOSIS — H40.9 UNSPECIFIED GLAUCOMA: ICD-10-CM

## 2017-05-10 DIAGNOSIS — E73.9 LACTOSE INTOLERANCE, UNSPECIFIED: ICD-10-CM

## 2017-05-10 DIAGNOSIS — J32.9 CHRONIC SINUSITIS, UNSPECIFIED: ICD-10-CM

## 2017-05-10 DIAGNOSIS — I49.9 CARDIAC ARRHYTHMIA, UNSPECIFIED: ICD-10-CM

## 2017-05-10 DIAGNOSIS — R60.9 EDEMA, UNSPECIFIED: ICD-10-CM

## 2017-05-10 DIAGNOSIS — D64.9 ANEMIA, UNSPECIFIED: ICD-10-CM

## 2017-05-17 ENCOUNTER — OUTPATIENT (OUTPATIENT)
Dept: OUTPATIENT SERVICES | Facility: HOSPITAL | Age: 82
LOS: 1 days | Discharge: HOME | End: 2017-05-17

## 2017-05-17 ENCOUNTER — APPOINTMENT (OUTPATIENT)
Dept: WOUND CARE | Facility: HOSPITAL | Age: 82
End: 2017-05-17

## 2017-05-17 DIAGNOSIS — Z95.0 PRESENCE OF CARDIAC PACEMAKER: Chronic | ICD-10-CM

## 2017-05-17 DIAGNOSIS — L89.90 PRESSURE ULCER OF UNSPECIFIED SITE, UNSPECIFIED STAGE: ICD-10-CM

## 2017-05-24 ENCOUNTER — APPOINTMENT (OUTPATIENT)
Dept: WOUND CARE | Facility: HOSPITAL | Age: 82
End: 2017-05-24

## 2017-05-24 ENCOUNTER — OUTPATIENT (OUTPATIENT)
Dept: OUTPATIENT SERVICES | Facility: HOSPITAL | Age: 82
LOS: 1 days | Discharge: HOME | End: 2017-05-24

## 2017-05-24 DIAGNOSIS — L89.90 PRESSURE ULCER OF UNSPECIFIED SITE, UNSPECIFIED STAGE: ICD-10-CM

## 2017-05-24 DIAGNOSIS — Z95.0 PRESENCE OF CARDIAC PACEMAKER: Chronic | ICD-10-CM

## 2017-05-26 DIAGNOSIS — Z74.1 NEED FOR ASSISTANCE WITH PERSONAL CARE: ICD-10-CM

## 2017-05-26 DIAGNOSIS — I10 ESSENTIAL (PRIMARY) HYPERTENSION: ICD-10-CM

## 2017-05-26 DIAGNOSIS — J32.9 CHRONIC SINUSITIS, UNSPECIFIED: ICD-10-CM

## 2017-05-26 DIAGNOSIS — G47.30 SLEEP APNEA, UNSPECIFIED: ICD-10-CM

## 2017-05-26 DIAGNOSIS — L97.812 NON-PRESSURE CHRONIC ULCER OF OTHER PART OF RIGHT LOWER LEG WITH FAT LAYER EXPOSED: ICD-10-CM

## 2017-05-26 DIAGNOSIS — H40.9 UNSPECIFIED GLAUCOMA: ICD-10-CM

## 2017-05-26 DIAGNOSIS — E73.9 LACTOSE INTOLERANCE, UNSPECIFIED: ICD-10-CM

## 2017-05-26 DIAGNOSIS — M79.604 PAIN IN RIGHT LEG: ICD-10-CM

## 2017-05-26 DIAGNOSIS — I50.22 CHRONIC SYSTOLIC (CONGESTIVE) HEART FAILURE: ICD-10-CM

## 2017-05-26 DIAGNOSIS — I49.9 CARDIAC ARRHYTHMIA, UNSPECIFIED: ICD-10-CM

## 2017-05-26 DIAGNOSIS — R60.9 EDEMA, UNSPECIFIED: ICD-10-CM

## 2017-05-26 DIAGNOSIS — Z79.899 OTHER LONG TERM (CURRENT) DRUG THERAPY: ICD-10-CM

## 2017-05-26 DIAGNOSIS — D64.9 ANEMIA, UNSPECIFIED: ICD-10-CM

## 2017-05-26 DIAGNOSIS — L92.9 GRANULOMATOUS DISORDER OF THE SKIN AND SUBCUTANEOUS TISSUE, UNSPECIFIED: ICD-10-CM

## 2017-05-31 ENCOUNTER — APPOINTMENT (OUTPATIENT)
Dept: WOUND CARE | Facility: HOSPITAL | Age: 82
End: 2017-05-31

## 2017-05-31 ENCOUNTER — OUTPATIENT (OUTPATIENT)
Dept: OUTPATIENT SERVICES | Facility: HOSPITAL | Age: 82
LOS: 1 days | Discharge: HOME | End: 2017-05-31

## 2017-05-31 DIAGNOSIS — L89.90 PRESSURE ULCER OF UNSPECIFIED SITE, UNSPECIFIED STAGE: ICD-10-CM

## 2017-05-31 DIAGNOSIS — Z95.0 PRESENCE OF CARDIAC PACEMAKER: Chronic | ICD-10-CM

## 2017-06-01 DIAGNOSIS — E73.9 LACTOSE INTOLERANCE, UNSPECIFIED: ICD-10-CM

## 2017-06-01 DIAGNOSIS — I50.22 CHRONIC SYSTOLIC (CONGESTIVE) HEART FAILURE: ICD-10-CM

## 2017-06-01 DIAGNOSIS — Z74.1 NEED FOR ASSISTANCE WITH PERSONAL CARE: ICD-10-CM

## 2017-06-01 DIAGNOSIS — Z79.899 OTHER LONG TERM (CURRENT) DRUG THERAPY: ICD-10-CM

## 2017-06-01 DIAGNOSIS — I10 ESSENTIAL (PRIMARY) HYPERTENSION: ICD-10-CM

## 2017-06-01 DIAGNOSIS — I49.9 CARDIAC ARRHYTHMIA, UNSPECIFIED: ICD-10-CM

## 2017-06-01 DIAGNOSIS — L97.812 NON-PRESSURE CHRONIC ULCER OF OTHER PART OF RIGHT LOWER LEG WITH FAT LAYER EXPOSED: ICD-10-CM

## 2017-06-01 DIAGNOSIS — M79.604 PAIN IN RIGHT LEG: ICD-10-CM

## 2017-06-01 DIAGNOSIS — G47.30 SLEEP APNEA, UNSPECIFIED: ICD-10-CM

## 2017-06-01 DIAGNOSIS — D64.9 ANEMIA, UNSPECIFIED: ICD-10-CM

## 2017-06-01 DIAGNOSIS — H40.9 UNSPECIFIED GLAUCOMA: ICD-10-CM

## 2017-06-01 DIAGNOSIS — J32.9 CHRONIC SINUSITIS, UNSPECIFIED: ICD-10-CM

## 2017-06-01 DIAGNOSIS — R60.9 EDEMA, UNSPECIFIED: ICD-10-CM

## 2017-06-06 ENCOUNTER — OUTPATIENT (OUTPATIENT)
Dept: OUTPATIENT SERVICES | Facility: HOSPITAL | Age: 82
LOS: 1 days | Discharge: HOME | End: 2017-06-06

## 2017-06-06 DIAGNOSIS — Z95.0 PRESENCE OF CARDIAC PACEMAKER: Chronic | ICD-10-CM

## 2017-06-06 DIAGNOSIS — L89.90 PRESSURE ULCER OF UNSPECIFIED SITE, UNSPECIFIED STAGE: ICD-10-CM

## 2017-06-09 DIAGNOSIS — I49.9 CARDIAC ARRHYTHMIA, UNSPECIFIED: ICD-10-CM

## 2017-06-09 DIAGNOSIS — Z79.899 OTHER LONG TERM (CURRENT) DRUG THERAPY: ICD-10-CM

## 2017-06-09 DIAGNOSIS — H40.9 UNSPECIFIED GLAUCOMA: ICD-10-CM

## 2017-06-09 DIAGNOSIS — L97.812 NON-PRESSURE CHRONIC ULCER OF OTHER PART OF RIGHT LOWER LEG WITH FAT LAYER EXPOSED: ICD-10-CM

## 2017-06-09 DIAGNOSIS — E73.9 LACTOSE INTOLERANCE, UNSPECIFIED: ICD-10-CM

## 2017-06-09 DIAGNOSIS — G47.30 SLEEP APNEA, UNSPECIFIED: ICD-10-CM

## 2017-06-09 DIAGNOSIS — M79.604 PAIN IN RIGHT LEG: ICD-10-CM

## 2017-06-09 DIAGNOSIS — Z74.1 NEED FOR ASSISTANCE WITH PERSONAL CARE: ICD-10-CM

## 2017-06-09 DIAGNOSIS — I10 ESSENTIAL (PRIMARY) HYPERTENSION: ICD-10-CM

## 2017-06-09 DIAGNOSIS — J32.9 CHRONIC SINUSITIS, UNSPECIFIED: ICD-10-CM

## 2017-06-09 DIAGNOSIS — I50.22 CHRONIC SYSTOLIC (CONGESTIVE) HEART FAILURE: ICD-10-CM

## 2017-06-09 DIAGNOSIS — R60.9 EDEMA, UNSPECIFIED: ICD-10-CM

## 2017-06-09 DIAGNOSIS — D64.9 ANEMIA, UNSPECIFIED: ICD-10-CM

## 2017-06-14 ENCOUNTER — OUTPATIENT (OUTPATIENT)
Dept: OUTPATIENT SERVICES | Facility: HOSPITAL | Age: 82
LOS: 1 days | Discharge: HOME | End: 2017-06-14

## 2017-06-14 DIAGNOSIS — L89.90 PRESSURE ULCER OF UNSPECIFIED SITE, UNSPECIFIED STAGE: ICD-10-CM

## 2017-06-14 DIAGNOSIS — Z95.0 PRESENCE OF CARDIAC PACEMAKER: Chronic | ICD-10-CM

## 2017-06-15 DIAGNOSIS — M79.604 PAIN IN RIGHT LEG: ICD-10-CM

## 2017-06-15 DIAGNOSIS — G47.30 SLEEP APNEA, UNSPECIFIED: ICD-10-CM

## 2017-06-15 DIAGNOSIS — R60.9 EDEMA, UNSPECIFIED: ICD-10-CM

## 2017-06-15 DIAGNOSIS — Z79.899 OTHER LONG TERM (CURRENT) DRUG THERAPY: ICD-10-CM

## 2017-06-15 DIAGNOSIS — L97.812 NON-PRESSURE CHRONIC ULCER OF OTHER PART OF RIGHT LOWER LEG WITH FAT LAYER EXPOSED: ICD-10-CM

## 2017-06-15 DIAGNOSIS — E73.9 LACTOSE INTOLERANCE, UNSPECIFIED: ICD-10-CM

## 2017-06-15 DIAGNOSIS — I10 ESSENTIAL (PRIMARY) HYPERTENSION: ICD-10-CM

## 2017-06-15 DIAGNOSIS — D64.9 ANEMIA, UNSPECIFIED: ICD-10-CM

## 2017-06-15 DIAGNOSIS — I49.9 CARDIAC ARRHYTHMIA, UNSPECIFIED: ICD-10-CM

## 2017-06-15 DIAGNOSIS — Z74.1 NEED FOR ASSISTANCE WITH PERSONAL CARE: ICD-10-CM

## 2017-06-15 DIAGNOSIS — I50.22 CHRONIC SYSTOLIC (CONGESTIVE) HEART FAILURE: ICD-10-CM

## 2017-06-15 DIAGNOSIS — J32.9 CHRONIC SINUSITIS, UNSPECIFIED: ICD-10-CM

## 2017-06-15 DIAGNOSIS — H40.9 UNSPECIFIED GLAUCOMA: ICD-10-CM

## 2017-06-21 ENCOUNTER — OUTPATIENT (OUTPATIENT)
Dept: OUTPATIENT SERVICES | Facility: HOSPITAL | Age: 82
LOS: 1 days | Discharge: HOME | End: 2017-06-21

## 2017-06-21 DIAGNOSIS — L89.90 PRESSURE ULCER OF UNSPECIFIED SITE, UNSPECIFIED STAGE: ICD-10-CM

## 2017-06-21 DIAGNOSIS — Z95.0 PRESENCE OF CARDIAC PACEMAKER: Chronic | ICD-10-CM

## 2017-06-26 DIAGNOSIS — G47.30 SLEEP APNEA, UNSPECIFIED: ICD-10-CM

## 2017-06-26 DIAGNOSIS — I10 ESSENTIAL (PRIMARY) HYPERTENSION: ICD-10-CM

## 2017-06-26 DIAGNOSIS — Z79.84 LONG TERM (CURRENT) USE OF ORAL HYPOGLYCEMIC DRUGS: ICD-10-CM

## 2017-06-26 DIAGNOSIS — I50.22 CHRONIC SYSTOLIC (CONGESTIVE) HEART FAILURE: ICD-10-CM

## 2017-06-26 DIAGNOSIS — D64.9 ANEMIA, UNSPECIFIED: ICD-10-CM

## 2017-06-26 DIAGNOSIS — J32.9 CHRONIC SINUSITIS, UNSPECIFIED: ICD-10-CM

## 2017-06-26 DIAGNOSIS — I49.9 CARDIAC ARRHYTHMIA, UNSPECIFIED: ICD-10-CM

## 2017-06-26 DIAGNOSIS — E73.9 LACTOSE INTOLERANCE, UNSPECIFIED: ICD-10-CM

## 2017-06-26 DIAGNOSIS — H40.9 UNSPECIFIED GLAUCOMA: ICD-10-CM

## 2017-06-26 DIAGNOSIS — M79.604 PAIN IN RIGHT LEG: ICD-10-CM

## 2017-06-26 DIAGNOSIS — R60.9 EDEMA, UNSPECIFIED: ICD-10-CM

## 2017-06-26 DIAGNOSIS — Z79.899 OTHER LONG TERM (CURRENT) DRUG THERAPY: ICD-10-CM

## 2017-06-26 DIAGNOSIS — Z74.1 NEED FOR ASSISTANCE WITH PERSONAL CARE: ICD-10-CM

## 2017-09-24 ENCOUNTER — INPATIENT (INPATIENT)
Facility: HOSPITAL | Age: 82
LOS: 2 days | End: 2017-09-27
Attending: INTERNAL MEDICINE | Admitting: HOSPITALIST
Payer: MEDICARE

## 2017-09-24 VITALS
OXYGEN SATURATION: 99 % | SYSTOLIC BLOOD PRESSURE: 131 MMHG | RESPIRATION RATE: 18 BRPM | DIASTOLIC BLOOD PRESSURE: 74 MMHG | HEIGHT: 63 IN | WEIGHT: 100.09 LBS | HEART RATE: 95 BPM

## 2017-09-24 DIAGNOSIS — Z95.0 PRESENCE OF CARDIAC PACEMAKER: Chronic | ICD-10-CM

## 2017-09-24 LAB
ALBUMIN SERPL ELPH-MCNC: 2.8 G/DL — LOW (ref 3.3–5)
ALP SERPL-CCNC: 116 U/L — SIGNIFICANT CHANGE UP (ref 40–120)
ALT FLD-CCNC: 159 U/L — HIGH (ref 12–78)
AMYLASE P1 CFR SERPL: 22 U/L — LOW (ref 25–115)
ANION GAP SERPL CALC-SCNC: 14 MMOL/L — SIGNIFICANT CHANGE UP (ref 5–17)
APPEARANCE UR: ABNORMAL
APTT BLD: 30.5 SEC — SIGNIFICANT CHANGE UP (ref 27.5–37.4)
AST SERPL-CCNC: 180 U/L — HIGH (ref 15–37)
BACTERIA # UR AUTO: ABNORMAL
BASOPHILS # BLD AUTO: 0.1 K/UL — SIGNIFICANT CHANGE UP (ref 0–0.2)
BASOPHILS NFR BLD AUTO: 0.6 % — SIGNIFICANT CHANGE UP (ref 0–2)
BILIRUB SERPL-MCNC: 1.2 MG/DL — SIGNIFICANT CHANGE UP (ref 0.2–1.2)
BILIRUB UR-MCNC: NEGATIVE — SIGNIFICANT CHANGE UP
BUN SERPL-MCNC: 45 MG/DL — HIGH (ref 7–23)
CALCIUM SERPL-MCNC: 9 MG/DL — SIGNIFICANT CHANGE UP (ref 8.5–10.1)
CHLORIDE SERPL-SCNC: 101 MMOL/L — SIGNIFICANT CHANGE UP (ref 96–108)
CK MB BLD-MCNC: 1.1 % — SIGNIFICANT CHANGE UP (ref 0–3.5)
CK MB CFR SERPL CALC: 0.9 NG/ML — SIGNIFICANT CHANGE UP (ref 0.5–3.6)
CK SERPL-CCNC: 81 U/L — SIGNIFICANT CHANGE UP (ref 26–192)
CO2 SERPL-SCNC: 27 MMOL/L — SIGNIFICANT CHANGE UP (ref 22–31)
COLOR SPEC: YELLOW — SIGNIFICANT CHANGE UP
COMMENT - URINE: SIGNIFICANT CHANGE UP
CREAT SERPL-MCNC: 1.73 MG/DL — HIGH (ref 0.5–1.3)
DIFF PNL FLD: ABNORMAL
EOSINOPHIL # BLD AUTO: 0 K/UL — SIGNIFICANT CHANGE UP (ref 0–0.5)
EOSINOPHIL NFR BLD AUTO: 0 % — SIGNIFICANT CHANGE UP (ref 0–6)
EPI CELLS # UR: SIGNIFICANT CHANGE UP
GLUCOSE SERPL-MCNC: 255 MG/DL — HIGH (ref 70–99)
GLUCOSE UR QL: NEGATIVE MG/DL — SIGNIFICANT CHANGE UP
HCT VFR BLD CALC: 42.7 % — SIGNIFICANT CHANGE UP (ref 34.5–45)
HGB BLD-MCNC: 13 G/DL — SIGNIFICANT CHANGE UP (ref 11.5–15.5)
HYALINE CASTS # UR AUTO: ABNORMAL /LPF
INR BLD: 1.29 RATIO — HIGH (ref 0.88–1.16)
KETONES UR-MCNC: ABNORMAL
LACTATE SERPL-SCNC: 4.5 MMOL/L — CRITICAL HIGH (ref 0.7–2)
LEUKOCYTE ESTERASE UR-ACNC: ABNORMAL
LIDOCAIN IGE QN: 59 U/L — LOW (ref 73–393)
LYMPHOCYTES # BLD AUTO: 0.4 K/UL — LOW (ref 1–3.3)
LYMPHOCYTES # BLD AUTO: 2.2 % — LOW (ref 13–44)
MCHC RBC-ENTMCNC: 29.5 PG — SIGNIFICANT CHANGE UP (ref 27–34)
MCHC RBC-ENTMCNC: 30.4 GM/DL — LOW (ref 32–36)
MCV RBC AUTO: 96.9 FL — SIGNIFICANT CHANGE UP (ref 80–100)
MONOCYTES # BLD AUTO: 0.9 K/UL — SIGNIFICANT CHANGE UP (ref 0–0.9)
MONOCYTES NFR BLD AUTO: 5.4 % — SIGNIFICANT CHANGE UP (ref 2–14)
NEUTROPHILS # BLD AUTO: 15.3 K/UL — HIGH (ref 1.8–7.4)
NEUTROPHILS NFR BLD AUTO: 91.8 % — HIGH (ref 43–77)
NITRITE UR-MCNC: NEGATIVE — SIGNIFICANT CHANGE UP
PH UR: 5 — SIGNIFICANT CHANGE UP (ref 5–8)
PLATELET # BLD AUTO: 134 K/UL — LOW (ref 150–400)
POTASSIUM SERPL-MCNC: 2.8 MMOL/L — CRITICAL LOW (ref 3.5–5.3)
POTASSIUM SERPL-SCNC: 2.8 MMOL/L — CRITICAL LOW (ref 3.5–5.3)
PROT SERPL-MCNC: 7.3 GM/DL — SIGNIFICANT CHANGE UP (ref 6–8.3)
PROT UR-MCNC: 100 MG/DL
PROTHROM AB SERPL-ACNC: 14.1 SEC — HIGH (ref 9.8–12.7)
RBC # BLD: 4.4 M/UL — SIGNIFICANT CHANGE UP (ref 3.8–5.2)
RBC # FLD: 12.5 % — SIGNIFICANT CHANGE UP (ref 11–15)
RBC CASTS # UR COMP ASSIST: SIGNIFICANT CHANGE UP /HPF (ref 0–4)
SODIUM SERPL-SCNC: 142 MMOL/L — SIGNIFICANT CHANGE UP (ref 135–145)
SP GR SPEC: 1.02 — SIGNIFICANT CHANGE UP (ref 1.01–1.02)
TROPONIN I SERPL-MCNC: 0.08 NG/ML — HIGH (ref 0.01–0.04)
TSH SERPL-MCNC: 0.55 UIU/ML — SIGNIFICANT CHANGE UP (ref 0.36–3.74)
UROBILINOGEN FLD QL: 1 MG/DL
WBC # BLD: 16.6 K/UL — HIGH (ref 3.8–10.5)
WBC # FLD AUTO: 16.6 K/UL — HIGH (ref 3.8–10.5)
WBC UR QL: SIGNIFICANT CHANGE UP

## 2017-09-24 PROCEDURE — 99285 EMERGENCY DEPT VISIT HI MDM: CPT

## 2017-09-24 PROCEDURE — 70450 CT HEAD/BRAIN W/O DYE: CPT | Mod: 26

## 2017-09-24 PROCEDURE — 72125 CT NECK SPINE W/O DYE: CPT | Mod: 26

## 2017-09-24 PROCEDURE — 72170 X-RAY EXAM OF PELVIS: CPT | Mod: 26

## 2017-09-24 PROCEDURE — 71010: CPT | Mod: 26

## 2017-09-24 PROCEDURE — 74176 CT ABD & PELVIS W/O CONTRAST: CPT | Mod: 26

## 2017-09-24 PROCEDURE — 76377 3D RENDER W/INTRP POSTPROCES: CPT | Mod: 26

## 2017-09-24 PROCEDURE — 99223 1ST HOSP IP/OBS HIGH 75: CPT | Mod: AI

## 2017-09-24 RX ORDER — POTASSIUM CHLORIDE 20 MEQ
20 PACKET (EA) ORAL ONCE
Qty: 0 | Refills: 0 | Status: COMPLETED | OUTPATIENT
Start: 2017-09-24 | End: 2017-09-24

## 2017-09-24 RX ORDER — METOPROLOL TARTRATE 50 MG
1 TABLET ORAL
Qty: 0 | Refills: 0 | COMMUNITY

## 2017-09-24 RX ORDER — SODIUM CHLORIDE 9 MG/ML
500 INJECTION INTRAMUSCULAR; INTRAVENOUS; SUBCUTANEOUS ONCE
Qty: 0 | Refills: 0 | Status: COMPLETED | OUTPATIENT
Start: 2017-09-24 | End: 2017-09-24

## 2017-09-24 RX ORDER — POTASSIUM CHLORIDE 20 MEQ
20 PACKET (EA) ORAL ONCE
Qty: 0 | Refills: 0 | Status: DISCONTINUED | OUTPATIENT
Start: 2017-09-24 | End: 2017-09-24

## 2017-09-24 RX ORDER — SODIUM CHLORIDE 9 MG/ML
1000 INJECTION INTRAMUSCULAR; INTRAVENOUS; SUBCUTANEOUS
Qty: 0 | Refills: 0 | Status: DISCONTINUED | OUTPATIENT
Start: 2017-09-24 | End: 2017-09-25

## 2017-09-24 RX ORDER — ACETAMINOPHEN 500 MG
650 TABLET ORAL ONCE
Qty: 0 | Refills: 0 | Status: COMPLETED | OUTPATIENT
Start: 2017-09-24 | End: 2017-09-24

## 2017-09-24 RX ORDER — PIPERACILLIN AND TAZOBACTAM 4; .5 G/20ML; G/20ML
3.38 INJECTION, POWDER, LYOPHILIZED, FOR SOLUTION INTRAVENOUS ONCE
Qty: 0 | Refills: 0 | Status: COMPLETED | OUTPATIENT
Start: 2017-09-24 | End: 2017-09-24

## 2017-09-24 RX ORDER — VANCOMYCIN HCL 1 G
1000 VIAL (EA) INTRAVENOUS ONCE
Qty: 0 | Refills: 0 | Status: COMPLETED | OUTPATIENT
Start: 2017-09-24 | End: 2017-09-24

## 2017-09-24 RX ORDER — SODIUM CHLORIDE 9 MG/ML
1300 INJECTION INTRAMUSCULAR; INTRAVENOUS; SUBCUTANEOUS ONCE
Qty: 0 | Refills: 0 | Status: COMPLETED | OUTPATIENT
Start: 2017-09-24 | End: 2017-09-24

## 2017-09-24 RX ADMIN — SODIUM CHLORIDE 1000 MILLILITER(S): 9 INJECTION INTRAMUSCULAR; INTRAVENOUS; SUBCUTANEOUS at 18:48

## 2017-09-24 RX ADMIN — Medication 250 MILLIGRAM(S): at 21:47

## 2017-09-24 RX ADMIN — SODIUM CHLORIDE 100 MILLILITER(S): 9 INJECTION INTRAMUSCULAR; INTRAVENOUS; SUBCUTANEOUS at 20:09

## 2017-09-24 RX ADMIN — PIPERACILLIN AND TAZOBACTAM 200 GRAM(S): 4; .5 INJECTION, POWDER, LYOPHILIZED, FOR SOLUTION INTRAVENOUS at 21:47

## 2017-09-24 RX ADMIN — Medication 20 MILLIEQUIVALENT(S): at 22:33

## 2017-09-24 RX ADMIN — Medication 20 MILLIEQUIVALENT(S): at 20:09

## 2017-09-24 RX ADMIN — SODIUM CHLORIDE 650 MILLILITER(S): 9 INJECTION INTRAMUSCULAR; INTRAVENOUS; SUBCUTANEOUS at 21:47

## 2017-09-24 RX ADMIN — Medication 650 MILLIGRAM(S): at 22:23

## 2017-09-24 NOTE — ED PROVIDER NOTE - CARE PLAN
Principal Discharge DX:	Syncope Principal Discharge DX:	Syncope  Secondary Diagnosis:	Hypokalemia  Secondary Diagnosis:	Elevated troponin Principal Discharge DX:	Sepsis, due to unspecified organism  Secondary Diagnosis:	Hypokalemia  Secondary Diagnosis:	Elevated troponin

## 2017-09-24 NOTE — H&P ADULT - ASSESSMENT
88 woman with PMH of glaucoma, HTN, HLD, paf, CHF, s/p PPM presents to ED  w/lethargy and fall w/pna and elevated troponin

## 2017-09-24 NOTE — H&P ADULT - NSHPPHYSICALEXAM_GEN_ALL_CORE
Vital Signs Last 24 Hrs  T(C): 37.3 (25 Sep 2017 05:59), Max: 39.6 (24 Sep 2017 21:40)  T(F): 99.2 (25 Sep 2017 05:59), Max: 103.2 (24 Sep 2017 21:40)  HR: 88 (25 Sep 2017 05:59) (74 - 99)  BP: 136/61 (25 Sep 2017 05:59) (98/68 - 136/61)  BP(mean): --  RR: 18 (25 Sep 2017 05:59) (15 - 31)  SpO2: 97% (25 Sep 2017 05:59) (96% - 99%)    PHYSICAL EXAM:    GENERAL: NAD, well-groomed, well-developed  HEAD:  Atraumatic, Normocephalic  EYES: EOMI, PERRLA, conjunctiva and sclera clear  ENMT: No tonsillar erythema, exudates, or enlargement; Moist mucous membranes, No lesions  NECK: Supple, No JVD, Normal thyroid  NERVOUS SYSTEM:  Alert & Oriented X3, Good concentration; Motor Strength 5/5 B/L upper and lower extremities; DTRs 2+ intact and symmetric  CHEST/LUNG: good air movement ronchi on r  HEART: irregular No rubs, or gallops, +S1,S2  ABDOMEN: Soft, Nontender, Nondistended; Bowel sounds present  EXTREMITIES:  2+ Peripheral Pulses, No clubbing, cyanosis, or edema, healed rle ulcer  LYMPH: No cervical adenopathy  RECTAL: deferred  BREAST: deferred   : deferred  SKIN: No rashes or lesions

## 2017-09-24 NOTE — ED ADULT TRIAGE NOTE - CHIEF COMPLAINT QUOTE
pt states she fell this afternoon. was on the floor for 20 minutes. pt complaining of weakness at triage. states she has not been eating and drinking. pt has history of htn and cva.

## 2017-09-24 NOTE — ED ADULT NURSE REASSESSMENT NOTE - NS ED NURSE REASSESS COMMENT FT1
pt received alert and oriented by 3. pt denies any cp, sob or discomfort. family members at bedside.

## 2017-09-24 NOTE — ED PROVIDER NOTE - OBJECTIVE STATEMENT
87 yo female with history of afib presents with family after patient was found on floor today. Patient last seen in normal physical state two days ago. Patient denies chest pain, shortness of breath, abdominal pain. Patient states that she felt and continues to feel weak. Patient denies loc, head pain.

## 2017-09-24 NOTE — H&P ADULT - NSHPLABSRESULTS_GEN_ALL_CORE
LABS:                        13.0   16.6  )-----------( 134      ( 24 Sep 2017 18:48 )             42.7         142  |  101  |  45<H>  ----------------------------<  255<H>  2.8<LL>   |  27  |  1.73<H>    Ca    9.0      24 Sep 2017 19:16    TPro  7.3  /  Alb  2.8<L>  /  TBili  1.2  /  DBili  x   /  AST  180<H>  /  ALT  159<H>  /  AlkPhos  116      PT/INR - ( 24 Sep 2017 18:48 )   PT: 14.1 sec;   INR: 1.29 ratio         PTT - ( 24 Sep 2017 18:48 )  PTT:30.5 sec  Urinalysis Basic - ( 24 Sep 2017 21:43 )    Color: Yellow / Appearance: x / S.020 / pH: x  Gluc: x / Ketone: Trace  / Bili: Negative / Urobili: 1 mg/dL   Blood: x / Protein: 100 mg/dL / Nitrite: Negative   Leuk Esterase: Trace / RBC: 0-2 /HPF / WBC 0-2   Sq Epi: x / Non Sq Epi: x / Bacteria: x      CAPILLARY BLOOD GLUCOSE          RADIOLOGY & ADDITIONAL TESTS:    Imaging Personally Reviewed:  [ X] YES  [ ] NO

## 2017-09-24 NOTE — ED PROVIDER NOTE - PROGRESS NOTE DETAILS
Pt signed out by Dr. Saleem as pending CT scans. Found to have a fever, lactate ordered and fluids and abx given. CXR suggests PNA. Admitted.

## 2017-09-24 NOTE — H&P ADULT - HISTORY OF PRESENT ILLNESS
Pt is a 89 y/o female  PMH of glaucoma, HTN, HLD, paroxysmal Afib, CHF, s/p PPM presents to ED with family for generalized weakness and fall today.   pt states over the last 2 days been more sleepy and tired and today as she was walking in the hhouse fell to side but pt was able to save the fall slowly and landed on her side, no head trauma or injury and comes to ed. pt denied any fever, chills, sob, cp palpitations n/v/d/c, no sick contacts or travels, no eating difficulty, lives alone has [facility]

## 2017-09-25 DIAGNOSIS — I10 ESSENTIAL (PRIMARY) HYPERTENSION: ICD-10-CM

## 2017-09-25 DIAGNOSIS — R74.8 ABNORMAL LEVELS OF OTHER SERUM ENZYMES: ICD-10-CM

## 2017-09-25 DIAGNOSIS — H40.9 UNSPECIFIED GLAUCOMA: ICD-10-CM

## 2017-09-25 DIAGNOSIS — Z29.9 ENCOUNTER FOR PROPHYLACTIC MEASURES, UNSPECIFIED: ICD-10-CM

## 2017-09-25 DIAGNOSIS — A41.9 SEPSIS, UNSPECIFIED ORGANISM: ICD-10-CM

## 2017-09-25 DIAGNOSIS — E87.6 HYPOKALEMIA: ICD-10-CM

## 2017-09-25 DIAGNOSIS — I48.0 PAROXYSMAL ATRIAL FIBRILLATION: ICD-10-CM

## 2017-09-25 DIAGNOSIS — E78.00 PURE HYPERCHOLESTEROLEMIA, UNSPECIFIED: ICD-10-CM

## 2017-09-25 LAB
ANION GAP SERPL CALC-SCNC: 12 MMOL/L — SIGNIFICANT CHANGE UP (ref 5–17)
BUN SERPL-MCNC: 43 MG/DL — HIGH (ref 7–23)
CALCIUM SERPL-MCNC: 8.2 MG/DL — LOW (ref 8.5–10.1)
CHLORIDE SERPL-SCNC: 107 MMOL/L — SIGNIFICANT CHANGE UP (ref 96–108)
CHOLEST SERPL-MCNC: 104 MG/DL — SIGNIFICANT CHANGE UP (ref 10–199)
CK MB BLD-MCNC: 0.8 % — SIGNIFICANT CHANGE UP (ref 0–3.5)
CK MB CFR SERPL CALC: 0.8 NG/ML — SIGNIFICANT CHANGE UP (ref 0.5–3.6)
CK SERPL-CCNC: 104 U/L — SIGNIFICANT CHANGE UP (ref 26–192)
CO2 SERPL-SCNC: 26 MMOL/L — SIGNIFICANT CHANGE UP (ref 22–31)
CREAT SERPL-MCNC: 1.48 MG/DL — HIGH (ref 0.5–1.3)
GLUCOSE SERPL-MCNC: 144 MG/DL — HIGH (ref 70–99)
HCT VFR BLD CALC: 35.4 % — SIGNIFICANT CHANGE UP (ref 34.5–45)
HDLC SERPL-MCNC: 55 MG/DL — SIGNIFICANT CHANGE UP (ref 40–125)
HGB BLD-MCNC: 11.2 G/DL — LOW (ref 11.5–15.5)
LACTATE SERPL-SCNC: 2.9 MMOL/L — HIGH (ref 0.7–2)
LACTATE SERPL-SCNC: 3.2 MMOL/L — HIGH (ref 0.7–2)
LIPID PNL WITH DIRECT LDL SERPL: 37 MG/DL — SIGNIFICANT CHANGE UP
MCHC RBC-ENTMCNC: 29.9 PG — SIGNIFICANT CHANGE UP (ref 27–34)
MCHC RBC-ENTMCNC: 31.8 GM/DL — LOW (ref 32–36)
MCV RBC AUTO: 94 FL — SIGNIFICANT CHANGE UP (ref 80–100)
PLATELET # BLD AUTO: 125 K/UL — LOW (ref 150–400)
POTASSIUM SERPL-MCNC: 3 MMOL/L — LOW (ref 3.5–5.3)
POTASSIUM SERPL-SCNC: 3 MMOL/L — LOW (ref 3.5–5.3)
RBC # BLD: 3.76 M/UL — LOW (ref 3.8–5.2)
RBC # FLD: 12.4 % — SIGNIFICANT CHANGE UP (ref 11–15)
SODIUM SERPL-SCNC: 145 MMOL/L — SIGNIFICANT CHANGE UP (ref 135–145)
TOTAL CHOLESTEROL/HDL RATIO MEASUREMENT: 1.9 RATIO — LOW (ref 3.3–7.1)
TRIGL SERPL-MCNC: 59 MG/DL — SIGNIFICANT CHANGE UP (ref 10–149)
TROPONIN I SERPL-MCNC: 0.12 NG/ML — HIGH (ref 0.01–0.04)
WBC # BLD: 10.6 K/UL — HIGH (ref 3.8–10.5)
WBC # FLD AUTO: 10.6 K/UL — HIGH (ref 3.8–10.5)

## 2017-09-25 PROCEDURE — 99233 SBSQ HOSP IP/OBS HIGH 50: CPT

## 2017-09-25 PROCEDURE — 93010 ELECTROCARDIOGRAM REPORT: CPT

## 2017-09-25 PROCEDURE — 99223 1ST HOSP IP/OBS HIGH 75: CPT

## 2017-09-25 RX ORDER — HEPARIN SODIUM 5000 [USP'U]/ML
5000 INJECTION INTRAVENOUS; SUBCUTANEOUS EVERY 12 HOURS
Qty: 0 | Refills: 0 | Status: DISCONTINUED | OUTPATIENT
Start: 2017-09-25 | End: 2017-09-27

## 2017-09-25 RX ORDER — LOSARTAN POTASSIUM 100 MG/1
50 TABLET, FILM COATED ORAL DAILY
Qty: 0 | Refills: 0 | Status: DISCONTINUED | OUTPATIENT
Start: 2017-09-25 | End: 2017-09-27

## 2017-09-25 RX ORDER — METOPROLOL TARTRATE 50 MG
25 TABLET ORAL DAILY
Qty: 0 | Refills: 0 | Status: DISCONTINUED | OUTPATIENT
Start: 2017-09-25 | End: 2017-09-27

## 2017-09-25 RX ORDER — FAMOTIDINE 10 MG/ML
20 INJECTION INTRAVENOUS DAILY
Qty: 0 | Refills: 0 | Status: DISCONTINUED | OUTPATIENT
Start: 2017-09-25 | End: 2017-09-26

## 2017-09-25 RX ORDER — PIPERACILLIN AND TAZOBACTAM 4; .5 G/20ML; G/20ML
3.38 INJECTION, POWDER, LYOPHILIZED, FOR SOLUTION INTRAVENOUS EVERY 12 HOURS
Qty: 0 | Refills: 0 | Status: DISCONTINUED | OUTPATIENT
Start: 2017-09-25 | End: 2017-09-27

## 2017-09-25 RX ORDER — ASPIRIN/CALCIUM CARB/MAGNESIUM 324 MG
81 TABLET ORAL DAILY
Qty: 0 | Refills: 0 | Status: DISCONTINUED | OUTPATIENT
Start: 2017-09-25 | End: 2017-09-27

## 2017-09-25 RX ORDER — DORZOLAMIDE HYDROCHLORIDE 20 MG/ML
1 SOLUTION/ DROPS OPHTHALMIC
Qty: 0 | Refills: 0 | Status: DISCONTINUED | OUTPATIENT
Start: 2017-09-25 | End: 2017-09-27

## 2017-09-25 RX ORDER — POTASSIUM CHLORIDE 20 MEQ
10 PACKET (EA) ORAL
Qty: 0 | Refills: 0 | Status: COMPLETED | OUTPATIENT
Start: 2017-09-25 | End: 2017-09-25

## 2017-09-25 RX ORDER — BRIMONIDINE TARTRATE 2 MG/MG
1 SOLUTION/ DROPS OPHTHALMIC
Qty: 0 | Refills: 0 | Status: DISCONTINUED | OUTPATIENT
Start: 2017-09-25 | End: 2017-09-27

## 2017-09-25 RX ORDER — LATANOPROST 0.05 MG/ML
1 SOLUTION/ DROPS OPHTHALMIC; TOPICAL AT BEDTIME
Qty: 0 | Refills: 0 | Status: DISCONTINUED | OUTPATIENT
Start: 2017-09-25 | End: 2017-09-27

## 2017-09-25 RX ORDER — AMIODARONE HYDROCHLORIDE 400 MG/1
200 TABLET ORAL DAILY
Qty: 0 | Refills: 0 | Status: DISCONTINUED | OUTPATIENT
Start: 2017-09-25 | End: 2017-09-27

## 2017-09-25 RX ORDER — SIMVASTATIN 20 MG/1
40 TABLET, FILM COATED ORAL AT BEDTIME
Qty: 0 | Refills: 0 | Status: DISCONTINUED | OUTPATIENT
Start: 2017-09-25 | End: 2017-09-25

## 2017-09-25 RX ORDER — VANCOMYCIN HCL 1 G
500 VIAL (EA) INTRAVENOUS EVERY 24 HOURS
Qty: 0 | Refills: 0 | Status: DISCONTINUED | OUTPATIENT
Start: 2017-09-25 | End: 2017-09-27

## 2017-09-25 RX ORDER — ATORVASTATIN CALCIUM 80 MG/1
40 TABLET, FILM COATED ORAL AT BEDTIME
Qty: 0 | Refills: 0 | Status: DISCONTINUED | OUTPATIENT
Start: 2017-09-25 | End: 2017-09-27

## 2017-09-25 RX ADMIN — Medication 100 MILLIEQUIVALENT(S): at 15:39

## 2017-09-25 RX ADMIN — Medication 100 MILLIEQUIVALENT(S): at 14:29

## 2017-09-25 RX ADMIN — Medication 25 MILLIGRAM(S): at 08:59

## 2017-09-25 RX ADMIN — SODIUM CHLORIDE 100 MILLILITER(S): 9 INJECTION INTRAMUSCULAR; INTRAVENOUS; SUBCUTANEOUS at 09:00

## 2017-09-25 RX ADMIN — Medication 100 MILLIGRAM(S): at 23:28

## 2017-09-25 RX ADMIN — LATANOPROST 1 DROP(S): 0.05 SOLUTION/ DROPS OPHTHALMIC; TOPICAL at 22:05

## 2017-09-25 RX ADMIN — HEPARIN SODIUM 5000 UNIT(S): 5000 INJECTION INTRAVENOUS; SUBCUTANEOUS at 19:40

## 2017-09-25 RX ADMIN — FAMOTIDINE 20 MILLIGRAM(S): 10 INJECTION INTRAVENOUS at 11:27

## 2017-09-25 RX ADMIN — Medication 100 MILLIEQUIVALENT(S): at 13:28

## 2017-09-25 RX ADMIN — BRIMONIDINE TARTRATE 1 DROP(S): 2 SOLUTION/ DROPS OPHTHALMIC at 19:39

## 2017-09-25 RX ADMIN — DORZOLAMIDE HYDROCHLORIDE 1 DROP(S): 20 SOLUTION/ DROPS OPHTHALMIC at 19:40

## 2017-09-25 RX ADMIN — PIPERACILLIN AND TAZOBACTAM 25 GRAM(S): 4; .5 INJECTION, POWDER, LYOPHILIZED, FOR SOLUTION INTRAVENOUS at 19:39

## 2017-09-25 RX ADMIN — Medication 81 MILLIGRAM(S): at 11:32

## 2017-09-25 RX ADMIN — ATORVASTATIN CALCIUM 40 MILLIGRAM(S): 80 TABLET, FILM COATED ORAL at 22:01

## 2017-09-25 RX ADMIN — LOSARTAN POTASSIUM 50 MILLIGRAM(S): 100 TABLET, FILM COATED ORAL at 08:59

## 2017-09-25 RX ADMIN — AMIODARONE HYDROCHLORIDE 200 MILLIGRAM(S): 400 TABLET ORAL at 08:59

## 2017-09-25 NOTE — PHYSICAL THERAPY INITIAL EVALUATION ADULT - CRITERIA FOR SKILLED THERAPEUTIC INTERVENTIONS
impairments found/functional limitations in following categories/therapy frequency/anticipated discharge recommendation/risk reduction/prevention/rehab potential/predicted duration of therapy intervention

## 2017-09-25 NOTE — CONSULT NOTE ADULT - SUBJECTIVE AND OBJECTIVE BOX
Infectious Diseases - Attending at Dr. Garcia    HPI:  Pt is a 89 y/o female  PMH of glaucoma, HTN, HLD, paroxysmal Afib, CHF, s/p PPM presents to ED with family for generalized weakness and fall today.   pt states over the last 2 days been more sleepy and tired and today as she was walking in the hhouse fell to side but pt was able to save the fall slowly and landed on her side, no head trauma or injury and comes to ed. pt denied any fever, chills, sob, cp palpitations n/v/d/c, no sick contacts or travels, no eating difficulty, lives alone has [facility] (24 Sep 2017 23:16)  doing better ,admits tto dry cough       PAST MEDICAL & SURGICAL HISTORY:  Pulmonary hypertension  PAF (paroxysmal atrial fibrillation)  Glaucoma  High cholesterol  HTN (hypertension)  Artificial pacemaker      Allergies    lactose (Unknown)  No Known Drug Allergies    Intolerances        FAMILY HISTORY:  No pertinent family history in first degree relatives      SOCIAL HISTORY:    REVIEW OF SYSTEMS:    Constitutional: No fever, weight loss or fatigue  Eyes: No eye pain, visual disturbances, or discharge  ENT:  No difficulty hearing, tinnitus, vertigo; No sinus or throat pain  Neck: No pain or stiffness  Respiratory: No cough, wheezing, chills or hemoptysis  Cardiovascular: No chest pain, palpitations, shortness of breath, dizziness or leg swelling  Gastrointestinal: No abdominal or epigastric pain. No nausea, vomiting or hematemesis; No diarrhea or constipation. No melena or hematochezia.  Genitourinary: No dysuria, frequency, hematuria or incontinence  Neurological: No headaches, memory loss, loss of strength, numbness or tremors  Skin: No itching, burning, rashes or lesions       MEDICATIONS  (STANDING):  piperacillin/tazobactam IVPB. 3.375 Gram(s) IV Intermittent every 12 hours  vancomycin  IVPB 500 milliGRAM(s) IV Intermittent every 24 hours  heparin  Injectable 5000 Unit(s) SubCutaneous every 12 hours  losartan 50 milliGRAM(s) Oral daily  metoprolol succinate ER 25 milliGRAM(s) Oral daily  brimonidine 0.2% Ophthalmic Solution 1 Drop(s) Both EYES two times a day  latanoprost 0.005% Ophthalmic Solution 1 Drop(s) Both EYES at bedtime  dorzolamide 2% Ophthalmic Solution 1 Drop(s) Both EYES two times a day  famotidine    Tablet 20 milliGRAM(s) Oral daily  amiodarone    Tablet 200 milliGRAM(s) Oral daily  aspirin enteric coated 81 milliGRAM(s) Oral daily  atorvastatin 40 milliGRAM(s) Oral at bedtime    MEDICATIONS  (PRN):      Vital Signs Last 24 Hrs  T(C): 36.6 (25 Sep 2017 17:15), Max: 37.9 (25 Sep 2017 00:27)  T(F): 97.8 (25 Sep 2017 17:15), Max: 100.2 (25 Sep 2017 00:27)  HR: 78 (25 Sep 2017 17:15) (74 - 109)  BP: 132/70 (25 Sep 2017 17:15) (118/64 - 136/61)  BP(mean): --  RR: 16 (25 Sep 2017 17:15) (16 - 31)  SpO2: 93% (25 Sep 2017 17:15) (93% - 98%)    PHYSICAL EXAM:    Constitutional: NAD, well-groomed, well-developed  HEENT: PERRLA, EOMI, Normal Hearing, MMM  Neck: No LAD, No JVD  Back: Normal spine flexure, No CVA tenderness  Respiratory: CTAB/L  Cardiovascular: S1 and S2, RRR, no M/G/R  Gastrointestinal: BS+, soft, NT/ND  Extremities: No peripheral edema  Vascular: 2+ peripheral pulses  Neurological: A/O x 3, no focal deficits  Skin: No rashes      LABS:                        11.2   10.6  )-----------( 125      ( 25 Sep 2017 08:16 )             35.4     -    145  |  107  |  43<H>  ----------------------------<  144<H>  3.0<L>   |  26  |  1.48<H>    Ca    8.2<L>      25 Sep 2017 08:16    TPro  7.3  /  Alb  2.8<L>  /  TBili  1.2  /  DBili  x   /  AST  180<H>  /  ALT  159<H>  /  AlkPhos  116  09-24    PT/INR - ( 24 Sep 2017 18:48 )   PT: 14.1 sec;   INR: 1.29 ratio         PTT - ( 24 Sep 2017 18:48 )  PTT:30.5 sec  Urinalysis Basic - ( 24 Sep 2017 21:43 )    Color: Yellow / Appearance: x / S.020 / pH: x  Gluc: x / Ketone: Trace  / Bili: Negative / Urobili: 1 mg/dL   Blood: x / Protein: 100 mg/dL / Nitrite: Negative   Leuk Esterase: Trace / RBC: 0-2 /HPF / WBC 0-2   Sq Epi: x / Non Sq Epi: x / Bacteria: x        MICROBIOLOGY:  RECENT CULTURES:        RADIOLOGY & ADDITIONAL STUDIES: Infectious Diseases - Attending at Dr. Garcia    HPI:  Pt is a 89 y/o female  PMH of glaucoma, HTN, HLD, paroxysmal Afib, CHF, s/p PPM presents to ED with family for generalized weakness and fall today.   pt states over the last 2 days been more sleepy and tired and today as she was walking in the hhouse fell to side but pt was able to save the fall slowly and landed on her side, no head trauma or injury and comes to ed. pt denied any fever, chills, sob, cp palpitations n/v/d/c, no sick contacts or travels, no eating difficulty, lives alone has [facility] (24 Sep 2017 23:16)  doing better ,admits tto dry cough       PAST MEDICAL & SURGICAL HISTORY:  Pulmonary hypertension  PAF (paroxysmal atrial fibrillation)  Glaucoma  High cholesterol  HTN (hypertension)  Artificial pacemaker      Allergies    lactose (Unknown)  No Known Drug Allergies    Intolerances        FAMILY HISTORY:  No pertinent family history in first degree relatives      SOCIAL HISTORY: non smoker    REVIEW OF SYSTEMS:    Constitutional: No fever, weight loss + fatigue  Eyes: No eye pain, visual disturbances, or discharge  ENT:  No difficulty hearing, tinnitus, vertigo; No sinus or throat pain  Neck: No pain or stiffness  Respiratory: + dry cough, wheezing, chills or hemoptysis  Cardiovascular: No chest pain, palpitations,+ shortness of breath, no dizziness or leg swelling  Gastrointestinal: No abdominal or epigastric pain. No nausea, vomiting or hematemesis; No diarrhea or constipation. No melena or hematochezia.  Genitourinary: No dysuria, frequency, hematuria or incontinence  Neurological: No headaches, memory loss, loss of strength, numbness or tremors  Skin: No itching, burning, rashes or lesions       MEDICATIONS  (STANDING):  piperacillin/tazobactam IVPB. 3.375 Gram(s) IV Intermittent every 12 hours  vancomycin  IVPB 500 milliGRAM(s) IV Intermittent every 24 hours  heparin  Injectable 5000 Unit(s) SubCutaneous every 12 hours  losartan 50 milliGRAM(s) Oral daily  metoprolol succinate ER 25 milliGRAM(s) Oral daily  brimonidine 0.2% Ophthalmic Solution 1 Drop(s) Both EYES two times a day  latanoprost 0.005% Ophthalmic Solution 1 Drop(s) Both EYES at bedtime  dorzolamide 2% Ophthalmic Solution 1 Drop(s) Both EYES two times a day  famotidine    Tablet 20 milliGRAM(s) Oral daily  amiodarone    Tablet 200 milliGRAM(s) Oral daily  aspirin enteric coated 81 milliGRAM(s) Oral daily  atorvastatin 40 milliGRAM(s) Oral at bedtime    MEDICATIONS  (PRN):      Vital Signs Last 24 Hrs  T(C): 36.6 (25 Sep 2017 17:15), Max: 37.9 (25 Sep 2017 00:27)  T(F): 97.8 (25 Sep 2017 17:15), Max: 100.2 (25 Sep 2017 00:27)  HR: 78 (25 Sep 2017 17:15) (74 - 109)  BP: 132/70 (25 Sep 2017 17:15) (118/64 - 136/61)  BP(mean): --  RR: 16 (25 Sep 2017 17:15) (16 - 31)  SpO2: 93% (25 Sep 2017 17:15) (93% - 98%)    PHYSICAL EXAM:    Constitutional: NAD, well-groomed, well-developed  HEENT: PERRLA, EOMI, Normal Hearing, MMM  Neck: No LAD, No JVD  Back: Normal spine flexure, No CVA tenderness  Respiratory: CTAB/L  Cardiovascular: S1 and S2, RRR, no M/G/R  Gastrointestinal: BS+, soft, NT/ND  Extremities: No peripheral edema  Vascular: 2+ peripheral pulses  Neurological: A/O x 3, no focal deficits  Skin: No rashes      LABS:                        11.2   10.6  )-----------( 125      ( 25 Sep 2017 08:16 )             35.4         145  |  107  |  43<H>  ----------------------------<  144<H>  3.0<L>   |  26  |  1.48<H>    Ca    8.2<L>      25 Sep 2017 08:16    TPro  7.3  /  Alb  2.8<L>  /  TBili  1.2  /  DBili  x   /  AST  180<H>  /  ALT  159<H>  /  AlkPhos  116  09-24    PT/INR - ( 24 Sep 2017 18:48 )   PT: 14.1 sec;   INR: 1.29 ratio         PTT - ( 24 Sep 2017 18:48 )  PTT:30.5 sec  Urinalysis Basic - ( 24 Sep 2017 21:43 )    Color: Yellow / Appearance: x / S.020 / pH: x  Gluc: x / Ketone: Trace  / Bili: Negative / Urobili: 1 mg/dL   Blood: x / Protein: 100 mg/dL / Nitrite: Negative   Leuk Esterase: Trace / RBC: 0-2 /HPF / WBC 0-2   Sq Epi: x / Non Sq Epi: x / Bacteria: x        MICROBIOLOGY:  RECENT CULTURES:        RADIOLOGY & ADDITIONAL STUDIES:  pt with sepsis with likely pna   < from: CT Abdomen and Pelvis No Cont (17 @ 21:12) >    IMPRESSION:     Diffusely increased density of the liver, which may reflect post   treatment changes (i.e., cardiac medication such as amiodarone,   transfusions). Recommend clinical correlation.    A 0.4 cm right UVJ stone with resultant mild right perinephricstranding.   No hydroureteronephrosis. Nonobstructing bilateral renal stones.   Recommend clinical question to assess urinary tract infection.    Patchy opacity in the right middle lobe, raising suspicion for pneumonia.   Nonspecific small bilateral lower lobe nodules. Recommend clinical   correlation and follow-up to assess resolution following completion of   treatment.    Additional findings as described.    < end of copied text >

## 2017-09-25 NOTE — CONSULT NOTE ADULT - ASSESSMENT
pt with sepsis with likely pna   check PCT   review old CT chest   cnt ai vaughan   follow on c/s  vanco level 88 ry old female seen with

## 2017-09-25 NOTE — CONSULT NOTE ADULT - SUBJECTIVE AND OBJECTIVE BOX
CARDIOLOGY CONSULT NOTE    Patient is a 88y Female with a known history of :  Preventive measure (Z29.9)  Essential hypertension (I10)  High cholesterol (E78.00)  Glaucoma (H40.9)  PAF (paroxysmal atrial fibrillation) (I48.0)  Hypokalemia (E87.6)  Elevated troponin (R74.8)  Sepsis, due to unspecified organism (A41.9)    HPI:  Pt is a 89 y/o female  PMH of glaucoma, HTN, HLD, paroxysmal Afib, CHF, s/p PPM presents to ED with family for generalized weakness and fall today.   pt states over the last 2 days been more sleepy and tired and today as she was walking in the hhouse fell to side but pt was able to save the fall slowly and landed on her side, no head trauma or injury and comes to ed. pt denied any fever, chills, sob, cp palpitations n/v/d/c, no sick contacts or travels, no eating difficulty, lives alone has [facility] (24 Sep 2017 23:16)      REVIEW OF SYSTEMS:    CONSTITUTIONAL: No fever, weight loss, or fatigue  EYES: No eye pain, visual disturbances, or discharge  ENMT:  No difficulty hearing, tinnitus, vertigo; No sinus or throat pain  NECK: No pain or stiffness  BREASTS: No pain, masses, or nipple discharge  RESPIRATORY: No cough, wheezing, chills or hemoptysis; No shortness of breath  CARDIOVASCULAR: No chest pain, palpitations, dizziness, or leg swelling  GASTROINTESTINAL: No abdominal or epigastric pain. No nausea, vomiting, or hematemesis; No diarrhea or constipation. No melena or hematochezia.  GENITOURINARY: No dysuria, frequency, hematuria, or incontinence  NEUROLOGICAL: No headaches, memory loss, loss of strength, numbness, or tremors  SKIN: No itching, burning, rashes, or lesions   LYMPH NODES: No enlarged glands  ENDOCRINE: No heat or cold intolerance; No hair loss  MUSCULOSKELETAL: No joint pain or swelling; No muscle, back, or extremity pain  PSYCHIATRIC: No depression, anxiety, mood swings, or difficulty sleeping  HEME/LYMPH: No easy bruising, or bleeding gums  ALLERGY AND IMMUNOLOGIC: No hives or eczema    MEDICATIONS  (STANDING):  piperacillin/tazobactam IVPB. 3.375 Gram(s) IV Intermittent every 12 hours  vancomycin  IVPB 500 milliGRAM(s) IV Intermittent every 24 hours  heparin  Injectable 5000 Unit(s) SubCutaneous every 12 hours  losartan 50 milliGRAM(s) Oral daily  metoprolol succinate ER 25 milliGRAM(s) Oral daily  brimonidine 0.2% Ophthalmic Solution 1 Drop(s) Both EYES two times a day  latanoprost 0.005% Ophthalmic Solution 1 Drop(s) Both EYES at bedtime  dorzolamide 2% Ophthalmic Solution 1 Drop(s) Both EYES two times a day  famotidine    Tablet 20 milliGRAM(s) Oral daily  amiodarone    Tablet 200 milliGRAM(s) Oral daily  aspirin enteric coated 81 milliGRAM(s) Oral daily  atorvastatin 40 milliGRAM(s) Oral at bedtime  potassium chloride  10 mEq/100 mL IVPB 10 milliEquivalent(s) IV Intermittent every 1 hour    MEDICATIONS  (PRN):      ALLERGIES: lactose (Unknown)  No Known Drug Allergies      FAMILY HISTORY:  No pertinent family history in first degree relatives      PHYSICAL EXAMINATION:  -----------------------------  T(C): 37.1 (09-25-17 @ 11:13), Max: 39.6 (09-24-17 @ 21:40)  HR: 84 (09-25-17 @ 11:13) (74 - 109)  BP: 124/70 (09-25-17 @ 11:13) (98/68 - 136/61)  RR: 17 (09-25-17 @ 11:13) (15 - 31)  SpO2: 93% (09-25-17 @ 11:13) (93% - 99%)  Wt(kg): --    09-25 @ 07:01  -  09-25 @ 13:51  --------------------------------------------------------  IN:    Oral Fluid: 370 mL    Solution: 200 mL  Total IN: 570 mL    OUT:  Total OUT: 0 mL    Total NET: 570 mL        Height (cm): 160.02 (09-24 @ 17:22)  Weight (kg): 48.8 (09-25 @ 07:02)  BMI (kg/m2): 19.1 (09-25 @ 07:02)  BSA (m2): 1.49 (09-25 @ 07:02)    Constitutional: well developed, normal appearance, well groomed, well nourished, no deformities and no acute distress.   Eyes: the conjunctiva exhibited no abnormalities and the eyelids demonstrated no xanthelasmas.   HEENT: normal oral mucosa, no oral pallor and no oral cyanosis.   Neck: normal jugular venous A waves present, normal jugular venous V waves present and no jugular venous white A waves.   Pulmonary: no respiratory distress, normal respiratory rhythm and effort, no accessory muscle use and lungs were clear to auscultation bilaterally.   Cardiovascular: heart rate and rhythm were normal, normal S1 and S2 and no murmur, gallop, rub, heave or thrill are present.   Abdomen: soft, non-tender, no hepato-splenomegaly and no abdominal mass palpated.   Musculoskeletal: the gait could not be assessed..   Extremities: no clubbing of the fingernails, no localized cyanosis, no petechial hemorrhages and no ischemic changes.   Skin: normal skin color and pigmentation, no rash, no venous stasis, no skin lesions, no skin ulcer and no xanthoma was observed.   Psychiatric: oriented to person, place, and time, the affect was normal, the mood was normal and not feeling anxious.     LABS:   --------  09-25    145  |  107  |  43<H>  ----------------------------<  144<H>  3.0<L>   |  26  |  1.48<H>    Ca    8.2<L>      25 Sep 2017 08:16    TPro  7.3  /  Alb  2.8<L>  /  TBili  1.2  /  DBili  x   /  AST  180<H>  /  ALT  159<H>  /  AlkPhos  116  09-24                         11.2   10.6  )-----------( 125      ( 25 Sep 2017 08:16 )             35.4     PT/INR - ( 24 Sep 2017 18:48 )   PT: 14.1 sec;   INR: 1.29 ratio         PTT - ( 24 Sep 2017 18:48 )  PTT:30.5 sec    09-25 @ 08:10 CPK total:--, CKMB --, Troponin I - .118 ng/mL<H>  09-24 @ 19:16 CPK total:--, CKMB --, Troponin I - .082 ng/mL<H>    104 mg/dL, 37 mg/dL, 55 mg/dL, 59 mg/dL        RADIOLOGY:  -----------------    < from: Xray Chest 1 View AP/PA. (09.24.17 @ 22:22) >    EXAM:  CHEST SINGLE VIEW                            PROCEDURE DATE:  09/24/2017          INTERPRETATION:    DATE OF STUDY: 9/24/17.    PRIOR: 4/4/17 plain chest.    CLINICAL INDICATION: 88-yo-female patient - febrile and weak; she fell.    TECHNIQUE: portable chest - upright.    FINDINGS:   Left-sided AICD in situ.  There is stable cardiomegaly.  There is a new, right infrahilar infiltrate.  Loculated fluid seen superior to the right minor fissure in the mid-right   chest, laterally.  The left lung is clear.  No bilateral pneumothoraces.  There are degenerative changes of the thoracic spine.    IMPRESSION:   Since 4/4/17 chest, new right infrahilar infiltrate has developed.  Loculated fluid seen above the right minor fissure.  Clear left lung.  Consider chest CT scan to further assess.                WINSTON OH M.D., ATTENDING RADIOLOGIST  This document has been electronically signed. Sep 25 2017  9:46AM                < end of copied text >      ECG: CARDIOLOGY CONSULT NOTE    Patient is a 88y Female with a known history of :  Preventive measure (Z29.9)  Essential hypertension (I10)  High cholesterol (E78.00)  Glaucoma (H40.9)  PAF (paroxysmal atrial fibrillation) (I48.0)  Hypokalemia (E87.6)  Elevated troponin (R74.8)  Sepsis, due to unspecified organism (A41.9)    HPI:  Pt is a 89 y/o female  PMH of glaucoma, HTN, HLD, paroxysmal Afib, CHF, s/p PPM presents to ED with family for generalized weakness and fall today.   pt states over the last 2 days been more sleepy and tired and today as she was walking in the hhouse fell to side but pt was able to save the fall slowly and landed on her side, no head trauma or injury and comes to ed. pt denied any fever, chills, sob, cp palpitations n/v/d/c, no sick contacts or travels, no eating difficulty, lives alone has [facility] (24 Sep 2017 23:16)      REVIEW OF SYSTEMS:    CONSTITUTIONAL: No fever, weight loss, or fatigue  EYES: No eye pain, visual disturbances, or discharge  ENMT:  No difficulty hearing, tinnitus, vertigo; No sinus or throat pain  NECK: No pain or stiffness  BREASTS: No pain, masses, or nipple discharge  RESPIRATORY: No cough, wheezing, chills or hemoptysis; No shortness of breath  CARDIOVASCULAR: No chest pain, palpitations, dizziness, or leg swelling  GASTROINTESTINAL: No abdominal or epigastric pain. No nausea, vomiting, or hematemesis; No diarrhea or constipation. No melena or hematochezia.  GENITOURINARY: No dysuria, frequency, hematuria, or incontinence  NEUROLOGICAL: No headaches, memory loss, loss of strength, numbness, or tremors  SKIN: No itching, burning, rashes, or lesions   LYMPH NODES: No enlarged glands  ENDOCRINE: No heat or cold intolerance; No hair loss  MUSCULOSKELETAL: No joint pain or swelling; No muscle, back, or extremity pain  PSYCHIATRIC: No depression, anxiety, mood swings, or difficulty sleeping  HEME/LYMPH: No easy bruising, or bleeding gums  ALLERGY AND IMMUNOLOGIC: No hives or eczema    MEDICATIONS  (STANDING):  piperacillin/tazobactam IVPB. 3.375 Gram(s) IV Intermittent every 12 hours  vancomycin  IVPB 500 milliGRAM(s) IV Intermittent every 24 hours  heparin  Injectable 5000 Unit(s) SubCutaneous every 12 hours  losartan 50 milliGRAM(s) Oral daily  metoprolol succinate ER 25 milliGRAM(s) Oral daily  brimonidine 0.2% Ophthalmic Solution 1 Drop(s) Both EYES two times a day  latanoprost 0.005% Ophthalmic Solution 1 Drop(s) Both EYES at bedtime  dorzolamide 2% Ophthalmic Solution 1 Drop(s) Both EYES two times a day  famotidine    Tablet 20 milliGRAM(s) Oral daily  amiodarone    Tablet 200 milliGRAM(s) Oral daily  aspirin enteric coated 81 milliGRAM(s) Oral daily  atorvastatin 40 milliGRAM(s) Oral at bedtime  potassium chloride  10 mEq/100 mL IVPB 10 milliEquivalent(s) IV Intermittent every 1 hour    MEDICATIONS  (PRN):      ALLERGIES: lactose (Unknown)  No Known Drug Allergies      FAMILY HISTORY:  No pertinent family history in first degree relatives      PHYSICAL EXAMINATION:  -----------------------------  T(C): 37.1 (09-25-17 @ 11:13), Max: 39.6 (09-24-17 @ 21:40)  HR: 84 (09-25-17 @ 11:13) (74 - 109)  BP: 124/70 (09-25-17 @ 11:13) (98/68 - 136/61)  RR: 17 (09-25-17 @ 11:13) (15 - 31)  SpO2: 93% (09-25-17 @ 11:13) (93% - 99%)  Wt(kg): --    09-25 @ 07:01  -  09-25 @ 13:51  --------------------------------------------------------  IN:    Oral Fluid: 370 mL    Solution: 200 mL  Total IN: 570 mL    OUT:  Total OUT: 0 mL    Total NET: 570 mL        Height (cm): 160.02 (09-24 @ 17:22)  Weight (kg): 48.8 (09-25 @ 07:02)  BMI (kg/m2): 19.1 (09-25 @ 07:02)  BSA (m2): 1.49 (09-25 @ 07:02)    Constitutional: well developed, normal appearance, well groomed, well nourished, no deformities and no acute distress.   Eyes: the conjunctiva exhibited no abnormalities and the eyelids demonstrated no xanthelasmas.   HEENT: normal oral mucosa, no oral pallor and no oral cyanosis.   Neck: normal jugular venous A waves present, normal jugular venous V waves present and no jugular venous white A waves.   Pulmonary: no respiratory distress, normal respiratory rhythm and effort, no accessory muscle use and lungs were clear to auscultation bilaterally.   Cardiovascular: heart rate and rhythm were normal, normal S1 and S2 and no murmur, gallop, rub, heave or thrill are present.   Abdomen: soft, non-tender, no hepato-splenomegaly and no abdominal mass palpated.   Musculoskeletal: the gait could not be assessed..   Extremities: no clubbing of the fingernails, no localized cyanosis, no petechial hemorrhages and no ischemic changes.   Skin: normal skin color and pigmentation, no rash, no venous stasis, no skin lesions, no skin ulcer and no xanthoma was observed.   Psychiatric: oriented to person, place, and time, the affect was normal, the mood was normal and not feeling anxious.     LABS:   --------  09-25    145  |  107  |  43<H>  ----------------------------<  144<H>  3.0<L>   |  26  |  1.48<H>    Ca    8.2<L>      25 Sep 2017 08:16    TPro  7.3  /  Alb  2.8<L>  /  TBili  1.2  /  DBili  x   /  AST  180<H>  /  ALT  159<H>  /  AlkPhos  116  09-24                         11.2   10.6  )-----------( 125      ( 25 Sep 2017 08:16 )             35.4     PT/INR - ( 24 Sep 2017 18:48 )   PT: 14.1 sec;   INR: 1.29 ratio         PTT - ( 24 Sep 2017 18:48 )  PTT:30.5 sec    09-25 @ 08:10 CPK total:--, CKMB --, Troponin I - .118 ng/mL<H>  09-24 @ 19:16 CPK total:--, CKMB --, Troponin I - .082 ng/mL<H>    104 mg/dL, 37 mg/dL, 55 mg/dL, 59 mg/dL        RADIOLOGY:  -----------------    < from: Xray Chest 1 View AP/PA. (09.24.17 @ 22:22) >    EXAM:  CHEST SINGLE VIEW                            PROCEDURE DATE:  09/24/2017          INTERPRETATION:    DATE OF STUDY: 9/24/17.    PRIOR: 4/4/17 plain chest.    CLINICAL INDICATION: 88-yo-female patient - febrile and weak; she fell.    TECHNIQUE: portable chest - upright.    FINDINGS:   Left-sided AICD in situ.  There is stable cardiomegaly.  There is a new, right infrahilar infiltrate.  Loculated fluid seen superior to the right minor fissure in the mid-right   chest, laterally.  The left lung is clear.  No bilateral pneumothoraces.  There are degenerative changes of the thoracic spine.    IMPRESSION:   Since 4/4/17 chest, new right infrahilar infiltrate has developed.  Loculated fluid seen above the right minor fissure.  Clear left lung.  Consider chest CT scan to further assess.                WINSTON OH M.D., ATTENDING RADIOLOGIST  This document has been electronically signed. Sep 25 2017  9:46AM                < end of copied text >      ECG:

## 2017-09-25 NOTE — PHYSICAL THERAPY INITIAL EVALUATION ADULT - GAIT DEVIATIONS NOTED, PT EVAL
increased time in double stance/decreased step length/decreased velocity of limb motion/decreased stride length/decreased weight-shifting ability

## 2017-09-25 NOTE — PHYSICAL THERAPY INITIAL EVALUATION ADULT - ADDITIONAL COMMENTS
Pt used a Rolling Walker for ambulation prior to admission. Pt has 5 steps to get into house c B rails.

## 2017-09-25 NOTE — PHYSICAL THERAPY INITIAL EVALUATION ADULT - PLANNED THERAPY INTERVENTIONS, PT EVAL
bed mobility training/balance training/gait training/postural re-education/strengthening/transfer training

## 2017-09-25 NOTE — CONSULT NOTE ADULT - PROBLEM SELECTOR RECOMMENDATION 9
likely sec to pna   cont zoysn and vanco (emperically )   check PCT   follow on c/s   leukocytosis and FATEMEH resolving

## 2017-09-25 NOTE — PHYSICAL THERAPY INITIAL EVALUATION ADULT - MODIFIED CLINICAL TEST OF SENSORY INTEGRATION IN BALANCE TEST
Barthel Index: Feeding Score _10/10__, Bathing Score 0_/5__, Grooming Score _5/5__, Dressing Score 5_/10__, Bowels Score _10_/10_, Bladder Score _5/10__, Toilet Score 5_/10__, Transfers Score 10_/15__, Mobility Score _0_/15_, Stairs Score _0/10__,     Total Score __45_/100

## 2017-09-25 NOTE — PHYSICAL THERAPY INITIAL EVALUATION ADULT - IMPAIRED TRANSFERS: BED/CHAIR, REHAB EVAL
decreased strength/impaired postural control/impaired balance/narrow base of support/decreased flexibility

## 2017-09-26 DIAGNOSIS — I27.2 OTHER SECONDARY PULMONARY HYPERTENSION: ICD-10-CM

## 2017-09-26 DIAGNOSIS — N17.9 ACUTE KIDNEY FAILURE, UNSPECIFIED: ICD-10-CM

## 2017-09-26 DIAGNOSIS — R53.83 OTHER FATIGUE: ICD-10-CM

## 2017-09-26 DIAGNOSIS — J15.6 PNEUMONIA DUE TO OTHER GRAM-NEGATIVE BACTERIA: ICD-10-CM

## 2017-09-26 DIAGNOSIS — R06.00 DYSPNEA, UNSPECIFIED: ICD-10-CM

## 2017-09-26 LAB
ALBUMIN SERPL ELPH-MCNC: 2 G/DL — LOW (ref 3.3–5)
ALP SERPL-CCNC: 73 U/L — SIGNIFICANT CHANGE UP (ref 40–120)
ALT FLD-CCNC: 92 U/L — HIGH (ref 12–78)
ANION GAP SERPL CALC-SCNC: 10 MMOL/L — SIGNIFICANT CHANGE UP (ref 5–17)
AST SERPL-CCNC: 69 U/L — HIGH (ref 15–37)
BASE EXCESS BLDA CALC-SCNC: -3.4 MMOL/L — LOW (ref -2–2)
BASE EXCESS BLDA CALC-SCNC: -9.6 MMOL/L — LOW (ref -2–2)
BILIRUB SERPL-MCNC: 1.3 MG/DL — HIGH (ref 0.2–1.2)
BLOOD GAS COMMENTS: SIGNIFICANT CHANGE UP
BLOOD GAS SOURCE: SIGNIFICANT CHANGE UP
BLOOD GAS SOURCE: SIGNIFICANT CHANGE UP
BUN SERPL-MCNC: 49 MG/DL — HIGH (ref 7–23)
CALCIUM SERPL-MCNC: 8.2 MG/DL — LOW (ref 8.5–10.1)
CHLORIDE SERPL-SCNC: 108 MMOL/L — SIGNIFICANT CHANGE UP (ref 96–108)
CO2 SERPL-SCNC: 24 MMOL/L — SIGNIFICANT CHANGE UP (ref 22–31)
CREAT SERPL-MCNC: 1.45 MG/DL — HIGH (ref 0.5–1.3)
CULTURE RESULTS: SIGNIFICANT CHANGE UP
FLUAV SPEC QL CULT: NEGATIVE — SIGNIFICANT CHANGE UP
FLUBV AG SPEC QL IA: NEGATIVE — SIGNIFICANT CHANGE UP
GGT SERPL-CCNC: 195 U/L — HIGH (ref 8–40)
GLUCOSE SERPL-MCNC: 151 MG/DL — HIGH (ref 70–99)
HCO3 BLDA-SCNC: 16 MMOL/L — LOW (ref 21–29)
HCO3 BLDA-SCNC: 19 MMOL/L — LOW (ref 21–29)
HCT VFR BLD CALC: 39.5 % — SIGNIFICANT CHANGE UP (ref 34.5–45)
HGB BLD-MCNC: 12.4 G/DL — SIGNIFICANT CHANGE UP (ref 11.5–15.5)
HOROWITZ INDEX BLDA+IHG-RTO: 100 — SIGNIFICANT CHANGE UP
HOROWITZ INDEX BLDA+IHG-RTO: 100 — SIGNIFICANT CHANGE UP
LACTATE SERPL-SCNC: 6 MMOL/L — CRITICAL HIGH (ref 0.7–2)
LACTATE SERPL-SCNC: 7.1 MMOL/L — CRITICAL HIGH (ref 0.7–2)
MCHC RBC-ENTMCNC: 30.1 PG — SIGNIFICANT CHANGE UP (ref 27–34)
MCHC RBC-ENTMCNC: 31.3 GM/DL — LOW (ref 32–36)
MCV RBC AUTO: 96.1 FL — SIGNIFICANT CHANGE UP (ref 80–100)
NT-PROBNP SERPL-SCNC: HIGH PG/ML (ref 0–450)
PCO2 BLDA: 28 MMHG — LOW (ref 32–46)
PCO2 BLDA: 34 MMHG — SIGNIFICANT CHANGE UP (ref 32–46)
PH BLD: 7.29 — LOW (ref 7.35–7.45)
PH BLD: 7.45 — SIGNIFICANT CHANGE UP (ref 7.35–7.45)
PLATELET # BLD AUTO: 140 K/UL — LOW (ref 150–400)
PO2 BLDA: 59 MMHG — LOW (ref 74–108)
PO2 BLDA: 87 MMHG — SIGNIFICANT CHANGE UP (ref 74–108)
POTASSIUM SERPL-MCNC: 3.4 MMOL/L — LOW (ref 3.5–5.3)
POTASSIUM SERPL-SCNC: 3.4 MMOL/L — LOW (ref 3.5–5.3)
PROT SERPL-MCNC: 5.7 GM/DL — LOW (ref 6–8.3)
RBC # BLD: 4.11 M/UL — SIGNIFICANT CHANGE UP (ref 3.8–5.2)
RBC # FLD: 12.7 % — SIGNIFICANT CHANGE UP (ref 11–15)
SAO2 % BLDA: 91 % — LOW (ref 92–96)
SAO2 % BLDA: 95 % — SIGNIFICANT CHANGE UP (ref 92–96)
SODIUM SERPL-SCNC: 142 MMOL/L — SIGNIFICANT CHANGE UP (ref 135–145)
SPECIMEN SOURCE: SIGNIFICANT CHANGE UP
TROPONIN I SERPL-MCNC: 0.11 NG/ML — HIGH (ref 0.01–0.04)
WBC # BLD: 7.6 K/UL — SIGNIFICANT CHANGE UP (ref 3.8–10.5)
WBC # FLD AUTO: 7.6 K/UL — SIGNIFICANT CHANGE UP (ref 3.8–10.5)

## 2017-09-26 PROCEDURE — 99233 SBSQ HOSP IP/OBS HIGH 50: CPT

## 2017-09-26 PROCEDURE — 31500 INSERT EMERGENCY AIRWAY: CPT

## 2017-09-26 PROCEDURE — 99291 CRITICAL CARE FIRST HOUR: CPT | Mod: 25

## 2017-09-26 PROCEDURE — 71010: CPT | Mod: 26

## 2017-09-26 PROCEDURE — 71010: CPT | Mod: 26,77

## 2017-09-26 RX ORDER — ACETAMINOPHEN 500 MG
650 TABLET ORAL EVERY 6 HOURS
Qty: 0 | Refills: 0 | Status: DISCONTINUED | OUTPATIENT
Start: 2017-09-26 | End: 2017-09-27

## 2017-09-26 RX ORDER — NOREPINEPHRINE BITARTRATE/D5W 8 MG/250ML
0.05 PLASTIC BAG, INJECTION (ML) INTRAVENOUS
Qty: 8 | Refills: 0 | Status: DISCONTINUED | OUTPATIENT
Start: 2017-09-26 | End: 2017-09-27

## 2017-09-26 RX ORDER — AZITHROMYCIN 500 MG/1
TABLET, FILM COATED ORAL
Qty: 0 | Refills: 0 | Status: DISCONTINUED | OUTPATIENT
Start: 2017-09-26 | End: 2017-09-27

## 2017-09-26 RX ORDER — AZITHROMYCIN 500 MG/1
500 TABLET, FILM COATED ORAL EVERY 24 HOURS
Qty: 0 | Refills: 0 | Status: DISCONTINUED | OUTPATIENT
Start: 2017-09-27 | End: 2017-09-27

## 2017-09-26 RX ORDER — IPRATROPIUM BROMIDE 0.2 MG/ML
500 SOLUTION, NON-ORAL INHALATION ONCE
Qty: 0 | Refills: 0 | Status: COMPLETED | OUTPATIENT
Start: 2017-09-26 | End: 2017-09-26

## 2017-09-26 RX ORDER — PANTOPRAZOLE SODIUM 20 MG/1
40 TABLET, DELAYED RELEASE ORAL DAILY
Qty: 0 | Refills: 0 | Status: DISCONTINUED | OUTPATIENT
Start: 2017-09-26 | End: 2017-09-27

## 2017-09-26 RX ORDER — ACETAMINOPHEN 500 MG
650 TABLET ORAL ONCE
Qty: 0 | Refills: 0 | Status: COMPLETED | OUTPATIENT
Start: 2017-09-26 | End: 2017-09-26

## 2017-09-26 RX ORDER — POTASSIUM CHLORIDE 20 MEQ
40 PACKET (EA) ORAL EVERY 4 HOURS
Qty: 0 | Refills: 0 | Status: DISCONTINUED | OUTPATIENT
Start: 2017-09-26 | End: 2017-09-26

## 2017-09-26 RX ORDER — CHLORHEXIDINE GLUCONATE 213 G/1000ML
1 SOLUTION TOPICAL DAILY
Qty: 0 | Refills: 0 | Status: DISCONTINUED | OUTPATIENT
Start: 2017-09-26 | End: 2017-09-27

## 2017-09-26 RX ORDER — FENTANYL CITRATE 50 UG/ML
50 INJECTION INTRAVENOUS ONCE
Qty: 0 | Refills: 0 | Status: DISCONTINUED | OUTPATIENT
Start: 2017-09-26 | End: 2017-09-26

## 2017-09-26 RX ORDER — SODIUM CHLORIDE 9 MG/ML
1000 INJECTION INTRAMUSCULAR; INTRAVENOUS; SUBCUTANEOUS
Qty: 0 | Refills: 0 | Status: DISCONTINUED | OUTPATIENT
Start: 2017-09-26 | End: 2017-09-26

## 2017-09-26 RX ORDER — PROPOFOL 10 MG/ML
1 INJECTION, EMULSION INTRAVENOUS
Qty: 1000 | Refills: 0 | Status: DISCONTINUED | OUTPATIENT
Start: 2017-09-26 | End: 2017-09-27

## 2017-09-26 RX ORDER — POTASSIUM CHLORIDE 20 MEQ
10 PACKET (EA) ORAL
Qty: 0 | Refills: 0 | Status: COMPLETED | OUTPATIENT
Start: 2017-09-26 | End: 2017-09-26

## 2017-09-26 RX ORDER — ETOMIDATE 2 MG/ML
20 INJECTION INTRAVENOUS ONCE
Qty: 0 | Refills: 0 | Status: COMPLETED | OUTPATIENT
Start: 2017-09-26 | End: 2017-09-26

## 2017-09-26 RX ORDER — FUROSEMIDE 40 MG
80 TABLET ORAL DAILY
Qty: 0 | Refills: 0 | Status: DISCONTINUED | OUTPATIENT
Start: 2017-09-26 | End: 2017-09-27

## 2017-09-26 RX ORDER — AZITHROMYCIN 500 MG/1
500 TABLET, FILM COATED ORAL ONCE
Qty: 0 | Refills: 0 | Status: COMPLETED | OUTPATIENT
Start: 2017-09-26 | End: 2017-09-26

## 2017-09-26 RX ORDER — IPRATROPIUM/ALBUTEROL SULFATE 18-103MCG
3 AEROSOL WITH ADAPTER (GRAM) INHALATION EVERY 6 HOURS
Qty: 0 | Refills: 0 | Status: DISCONTINUED | OUTPATIENT
Start: 2017-09-26 | End: 2017-09-27

## 2017-09-26 RX ORDER — FENTANYL CITRATE 50 UG/ML
50 INJECTION INTRAVENOUS
Qty: 2500 | Refills: 0 | Status: DISCONTINUED | OUTPATIENT
Start: 2017-09-26 | End: 2017-09-26

## 2017-09-26 RX ORDER — FUROSEMIDE 40 MG
40 TABLET ORAL ONCE
Qty: 0 | Refills: 0 | Status: COMPLETED | OUTPATIENT
Start: 2017-09-26 | End: 2017-09-26

## 2017-09-26 RX ORDER — FENTANYL CITRATE 50 UG/ML
1 INJECTION INTRAVENOUS
Qty: 2500 | Refills: 0 | Status: DISCONTINUED | OUTPATIENT
Start: 2017-09-26 | End: 2017-09-27

## 2017-09-26 RX ORDER — CHLORHEXIDINE GLUCONATE 213 G/1000ML
15 SOLUTION TOPICAL
Qty: 0 | Refills: 0 | Status: DISCONTINUED | OUTPATIENT
Start: 2017-09-26 | End: 2017-09-27

## 2017-09-26 RX ADMIN — LATANOPROST 1 DROP(S): 0.05 SOLUTION/ DROPS OPHTHALMIC; TOPICAL at 21:30

## 2017-09-26 RX ADMIN — FENTANYL CITRATE 50 MICROGRAM(S): 50 INJECTION INTRAVENOUS at 22:30

## 2017-09-26 RX ADMIN — Medication 80 MILLIGRAM(S): at 18:27

## 2017-09-26 RX ADMIN — Medication 100 MILLIEQUIVALENT(S): at 19:57

## 2017-09-26 RX ADMIN — AMIODARONE HYDROCHLORIDE 200 MILLIGRAM(S): 400 TABLET ORAL at 05:25

## 2017-09-26 RX ADMIN — Medication 650 MILLIGRAM(S): at 18:25

## 2017-09-26 RX ADMIN — HEPARIN SODIUM 5000 UNIT(S): 5000 INJECTION INTRAVENOUS; SUBCUTANEOUS at 18:26

## 2017-09-26 RX ADMIN — FAMOTIDINE 20 MILLIGRAM(S): 10 INJECTION INTRAVENOUS at 12:07

## 2017-09-26 RX ADMIN — ETOMIDATE 20 MILLIGRAM(S): 2 INJECTION INTRAVENOUS at 21:13

## 2017-09-26 RX ADMIN — DORZOLAMIDE HYDROCHLORIDE 1 DROP(S): 20 SOLUTION/ DROPS OPHTHALMIC at 18:27

## 2017-09-26 RX ADMIN — Medication 40 MILLIGRAM(S): at 15:23

## 2017-09-26 RX ADMIN — Medication 25 MILLIGRAM(S): at 12:07

## 2017-09-26 RX ADMIN — Medication 650 MILLIGRAM(S): at 00:34

## 2017-09-26 RX ADMIN — PIPERACILLIN AND TAZOBACTAM 25 GRAM(S): 4; .5 INJECTION, POWDER, LYOPHILIZED, FOR SOLUTION INTRAVENOUS at 05:25

## 2017-09-26 RX ADMIN — Medication 81 MILLIGRAM(S): at 12:07

## 2017-09-26 RX ADMIN — DORZOLAMIDE HYDROCHLORIDE 1 DROP(S): 20 SOLUTION/ DROPS OPHTHALMIC at 05:25

## 2017-09-26 RX ADMIN — Medication 100 MILLIEQUIVALENT(S): at 18:02

## 2017-09-26 RX ADMIN — Medication 100 MILLIGRAM(S): at 22:59

## 2017-09-26 RX ADMIN — BRIMONIDINE TARTRATE 1 DROP(S): 2 SOLUTION/ DROPS OPHTHALMIC at 18:26

## 2017-09-26 RX ADMIN — BRIMONIDINE TARTRATE 1 DROP(S): 2 SOLUTION/ DROPS OPHTHALMIC at 06:12

## 2017-09-26 RX ADMIN — FENTANYL CITRATE 5.04 MICROGRAM(S)/KG/HR: 50 INJECTION INTRAVENOUS at 22:23

## 2017-09-26 RX ADMIN — SODIUM CHLORIDE 75 MILLILITER(S): 9 INJECTION INTRAMUSCULAR; INTRAVENOUS; SUBCUTANEOUS at 09:37

## 2017-09-26 RX ADMIN — Medication 100 MILLIEQUIVALENT(S): at 18:50

## 2017-09-26 RX ADMIN — Medication 4.72 MICROGRAM(S)/KG/MIN: at 21:10

## 2017-09-26 RX ADMIN — PIPERACILLIN AND TAZOBACTAM 25 GRAM(S): 4; .5 INJECTION, POWDER, LYOPHILIZED, FOR SOLUTION INTRAVENOUS at 19:04

## 2017-09-26 RX ADMIN — AZITHROMYCIN 255 MILLIGRAM(S): 500 TABLET, FILM COATED ORAL at 17:25

## 2017-09-26 RX ADMIN — FENTANYL CITRATE 50 MICROGRAM(S): 50 INJECTION INTRAVENOUS at 22:09

## 2017-09-26 RX ADMIN — Medication 500 MICROGRAM(S): at 16:30

## 2017-09-26 RX ADMIN — PROPOFOL 0.3 MICROGRAM(S)/KG/MIN: 10 INJECTION, EMULSION INTRAVENOUS at 22:59

## 2017-09-26 RX ADMIN — Medication 3 MILLILITER(S): at 23:37

## 2017-09-26 NOTE — DIETITIAN INITIAL EVALUATION ADULT. - SOURCE
Chart review/patient/other (specify) patient/other (specify)/RN, Chart review, pt c shortness of breath, appeared weak

## 2017-09-26 NOTE — CHART NOTE - NSCHARTNOTEFT_GEN_A_CORE
Upon Nutritional Assessment by the Registered Dietitian your patient was determined to meet criteria / has evidence of the following diagnosis/diagnoses:          [ ]  Mild Protein Calorie Malnutrition        [ ]  Moderate Protein Calorie Malnutrition        [ X] Severe Protein Calorie Malnutrition        [ ] Unspecified Protein Calorie Malnutrition        [ ] Underweight / BMI <19        [ ] Morbid Obesity / BMI > 40      Findings as based on:  •  Comprehensive nutrition assessment and consultation  •  Calorie counts (nutrient intake analysis)  •  Food acceptance and intake status from observations by staff  •  Follow up  •  Patient education  •  Intervention secondary to interdisciplinary rounds  •   concerns      Treatment:    The following diet has been recommended:  Dysphagia 1 Pureed- Thin Liquids , Low sodium , Ensure Enlive 2 x day=750 calories, 40 grams protein   Recommend obtain swallow evaluation      PROVIDER Section:     By signing this assessment you are acknowledging and agree with the diagnosis/diagnoses assigned by the Registered Dietitian    Comments:

## 2017-09-26 NOTE — CONSULT NOTE ADULT - ATTENDING COMMENTS
I have seen and examined the patient. I agree with the above history, physical exam, and plan of care except for as detailed below.     87 y/o female w/HFpEF, moderate-severe PH, and paroxysmal Afib admitted for general weakness and fall found to have R sided PNA on imaging now w/severe respiratory distress and lactic acidosis likely secondary to sepsis vs increased WOB, s/p failed trial of IV lasix 80mg as patient with no improvement in respiratory status following diuresis now intubated for respiratory distress and hypoxemic respiratory failure likely secondary to combination of PNA and acute on chronic heart failure.    Neuro: Sedation with fentanyl PRN, can use drip if needed. Goal RASS o to -2.  Respiratory: Now intubated. LTVV 6cc/Kg IBW, continue broad spectrum antibiotics for PNA, diuresis as tolerated  CV: Diuresis as tolerated goal net negative 500cc-1L  GI: KENNEDY  Renal: FATEMEH unclear etiology. Trend Cr, avoid nephrotoxins  ID: Vanc, Zosyn, Azithro for PNA. Send tracheal aspirate  FEN: Replete lytes PRN  PPx: DVT/GI prophylaxis  Dispo: Requires ICU level of care    Attending critical care time 45 minutes

## 2017-09-26 NOTE — DIETITIAN INITIAL EVALUATION ADULT. - PHYSICAL APPEARANCE
BMI=19.1(09-25)/other (specify) BMI=19.1(09-25), Nutrition focused physical exam conducted; found physical signs of malnutrition [ ]absent [ x]present; Subcutaneous fat Exam;  [ severe ]  Orbital fat pads region,  [ moderate ]Buccal fat region,  [ moderate ]triceps region, [ moderate ]ribs region.  Muscle Exam; [ severe ]temples region, [  severe]clavicle region, [  severe]shoulder region, [severe  ]Scapula region, [ moderate ]Interosseous region, [ moderate ]thigh region, [ moderate ]Calf region/other (specify)/debilitated

## 2017-09-26 NOTE — DIETITIAN INITIAL EVALUATION ADULT. - OTHER INFO
Pt seen due to RN consult for do you have questions about your diet. Pt seen due to RN consult for do you have questions about your diet.  Pt reports that she was taking 1-2 cans of Boost and c meal consumption <75% usual intake x 1 year.

## 2017-09-26 NOTE — PROGRESS NOTE ADULT - SUBJECTIVE AND OBJECTIVE BOX
Patient is a 88y old  Female who presents with a chief complaint of lethargy and fall (24 Sep 2017 23:16)      INTERVAL HPI / OVERNIGHT EVENTS:lethargy + no fevr mcough etc    MEDICATIONS  (STANDING):  piperacillin/tazobactam IVPB. 3.375 Gram(s) IV Intermittent every 12 hours  vancomycin  IVPB 500 milliGRAM(s) IV Intermittent every 24 hours  heparin  Injectable 5000 Unit(s) SubCutaneous every 12 hours  losartan 50 milliGRAM(s) Oral daily  metoprolol succinate ER 25 milliGRAM(s) Oral daily  brimonidine 0.2% Ophthalmic Solution 1 Drop(s) Both EYES two times a day  latanoprost 0.005% Ophthalmic Solution 1 Drop(s) Both EYES at bedtime  dorzolamide 2% Ophthalmic Solution 1 Drop(s) Both EYES two times a day  amiodarone    Tablet 200 milliGRAM(s) Oral daily  aspirin enteric coated 81 milliGRAM(s) Oral daily  atorvastatin 40 milliGRAM(s) Oral at bedtime  azithromycin  IVPB      furosemide   Injectable 80 milliGRAM(s) IV Push daily  norepinephrine Infusion 0.05 MICROgram(s)/kG/Min (4.725 mL/Hr) IV Continuous <Continuous>  chlorhexidine 0.12% Liquid 15 milliLiter(s) Swish and Spit two times a day  chlorhexidine 4% Liquid 1 Application(s) Topical daily  pantoprazole  Injectable 40 milliGRAM(s) IV Push daily  fentaNYL   Infusion 1 MICROgram(s)/kG/Hr (5.04 mL/Hr) IV Continuous <Continuous>  ALBUTerol/ipratropium for Nebulization 3 milliLiter(s) Nebulizer every 6 hours  propofol Infusion 1 MICROgram(s)/kG/Min (0.302 mL/Hr) IV Continuous <Continuous>    MEDICATIONS  (PRN):  acetaminophen  Suppository 650 milliGRAM(s) Rectal every 6 hours PRN For Temp greater than 38 C (100.4 F)      Vital Signs Last 24 Hrs  T(C): 38.9 (26 Sep 2017 20:00), Max: 39.7 (26 Sep 2017 17:08)  T(F): 102 (26 Sep 2017 20:00), Max: 103.5 (26 Sep 2017 17:08)  HR: 110 (26 Sep 2017 22:02) (67 - 125)  BP: 132/85 (26 Sep 2017 22:00) (89/57 - 151/93)  BP(mean): 97 (26 Sep 2017 22:00) (65 - 100)  RR: 35 (26 Sep 2017 22:00) (16 - 48)  SpO2: 100% (26 Sep 2017 22:02) (73% - 100%)    PHYSICAL EXAM:  General :NAD  Constitutional:  well-groomed, well-developed  Respiratory: CTAB/L  Cardiovascular: S1 and S2, RRR, no M/G/R  Gastrointestinal: BS+, soft, NT/ND  Extremities: No peripheral edema  Vascular: 2+ peripheral pulses  Skin: No rashes      LABS:                        12.4   7.6   )-----------( 140      ( 26 Sep 2017 15:55 )             39.5     09-26    142  |  108  |  49<H>  ----------------------------<  151<H>  3.4<L>   |  24  |  1.45<H>    Ca    8.2<L>      26 Sep 2017 07:57    TPro  5.7<L>  /  Alb  2.0<L>  /  TBili  1.3<H>  /  DBili  x   /  AST  69<H>  /  ALT  92<H>  /  AlkPhos  73  09-26          MICROBIOLOGY:  RECENT CULTURES:        RADIOLOGY & ADDITIONAL STUDIES:
Patient seen for SOB/Acute resp distress.  Patient is a 88y old  Female who presents with a chief complaint of lethargy and fall (24 Sep 2017 23:16). Patient in bed with c/o of SOB worsening over the last few minutes.    CONSTITUTIONAL: No fever, weight loss or fatigue  EYES: No eye pain, visual disturbances, or discharge  ENMT: Denies  difficulty hearing, tinnitis, vertigo, sinus or   throat pain   NECK: Denies pain or stiffness  BREAST: Denies pain, masses, or nipple discharge    RESP:  Report incidents of hemoptysis this AM and worsening SOB.   CV: Denies  chest pain, SOB, PEREZ, palpitations, dizziness, lightheadedness or leg swelling  GI: Denies abdominal  or epigastric pain, nausea, vomiting, hematemesis; diarrhea or changes in bowel pattern, no melena or hematochezia.  : Denies dysuria, urgency, frequency, retention, hematuria or incontinence  SKIN: Denies itching, burning, rashes or lesions  MSK: Denies edema, pain, difficulty with ambulation, joint pain or swelling, muscle or back pain  NEURO: Denies weakness, numbness, tingling, loss of sensation, vision, headaches, memory loss  LYMPH: Denies pain or enlarged glands  ENDO: Denies heat or cold intolerances, hair loss  PSYCH: Denies depression, anxiety of SI  HEME: Denies easy brusing or bleeding gums  ALLERGY/IMMUNOLOGY: Denies hives, eczema     GENERAL: NAD, well-groomed, well-developed  HEAD:  Atraumatic, Normocephalic  EYES: EOMI, PERRLA, conjunctiva and sclera clear  ENMT: No tonsillar erythema, exudates, or enlargement; Moist mucous membranes.  NECK: Supple, No JVD, Normal thyroid  NERVOUS SYSTEM:  Alert & Oriented X3,    CHEST/LUNG: Decreased air flow with mild congestion in adam lower lobes  HEART: Tachycardia 125BPM, Regular rhythm  ABDOMEN: Soft, Nontender, Nondistended; Bowel sounds present  EXTREMITIES:  2+ Peripheral Pulses, No clubbing, cyanosis, or edema
Patient is a 88y old  Female who presents with a chief complaint of lethargy and fall (24 Sep 2017 23:16)      INTERVAL HPI/OVERNIGHT EVENTS: no acute events.  denies cp, sob, palpitations. pt able to tolerate PO. denies syncope or dizziness.     MEDICATIONS  (STANDING):  sodium chloride 0.9%. 1000 milliLiter(s) (100 mL/Hr) IV Continuous <Continuous>  piperacillin/tazobactam IVPB. 3.375 Gram(s) IV Intermittent every 12 hours  vancomycin  IVPB 500 milliGRAM(s) IV Intermittent every 24 hours  heparin  Injectable 5000 Unit(s) SubCutaneous every 12 hours  losartan 50 milliGRAM(s) Oral daily  metoprolol succinate ER 25 milliGRAM(s) Oral daily  brimonidine 0.2% Ophthalmic Solution 1 Drop(s) Both EYES two times a day  latanoprost 0.005% Ophthalmic Solution 1 Drop(s) Both EYES at bedtime  dorzolamide 2% Ophthalmic Solution 1 Drop(s) Both EYES two times a day  famotidine    Tablet 20 milliGRAM(s) Oral daily  amiodarone    Tablet 200 milliGRAM(s) Oral daily  aspirin enteric coated 81 milliGRAM(s) Oral daily  atorvastatin 40 milliGRAM(s) Oral at bedtime  potassium chloride  10 mEq/100 mL IVPB 10 milliEquivalent(s) IV Intermittent every 1 hour    MEDICATIONS  (PRN):      Allergies    lactose (Unknown)  No Known Drug Allergies    Intolerances        REVIEW OF SYSTEMS:  CONSTITUTIONAL: No fever, weight loss, or fatigue  EYES: No eye pain, visual disturbances, or discharge  ENMT:  No difficulty hearing, tinnitus, vertigo; No sinus or throat pain  NECK: No pain or stiffness  RESPIRATORY: No cough, wheezing, chills or hemoptysis; No shortness of breath  CARDIOVASCULAR: No chest pain, palpitations, dizziness, or leg swelling  GASTROINTESTINAL: No abdominal or epigastric pain. No nausea, vomiting, or hematemesis;   GENITOURINARY: No dysuria, frequency, hematuria, or incontinence  NEUROLOGICAL: No headaches, memory loss, loss of strength, numbness, or tremors  SKIN: No itching, burning, rashes, or lesions   LYMPH NODES: No enlarged glands  ENDOCRINE: No heat or cold intolerance; No hair loss  MUSCULOSKELETAL: No joint pain or swelling; No muscle, back, or extremity pain  PSYCHIATRIC: No depression, anxiety, mood swings, or difficulty sleeping  HEME/LYMPH: No easy bruising, or bleeding gums  ALLERGY AND IMMUNOLOGIC: No hives or eczema    Vital Signs Last 24 Hrs  T(C): 37.1 (25 Sep 2017 11:13), Max: 39.6 (24 Sep 2017 21:40)  T(F): 98.8 (25 Sep 2017 11:13), Max: 103.2 (24 Sep 2017 21:40)  HR: 84 (25 Sep 2017 11:13) (74 - 109)  BP: 124/70 (25 Sep 2017 11:13) (98/68 - 136/61)  BP(mean): --  RR: 17 (25 Sep 2017 11:13) (15 - 31)  SpO2: 93% (25 Sep 2017 11:13) (93% - 99%)    PHYSICAL EXAM:  GENERAL: NAD, well-groomed, well-developed  HEAD:  Atraumatic, Normocephalic  EYES: EOMI, PERRLA, conjunctiva and sclera clear  ENMT: No tonsillar erythema, exudates, or enlargement; Moist mucous membranes, Good dentition, No lesions  NECK: Supple, No JVD, Normal thyroid  NERVOUS SYSTEM:  Alert & Oriented X3, Good concentration; Motor Strength 5/5 B/L upper and lower extremities; DTRs 2+ intact and symmetric  CHEST/LUNG: Clear to percussion bilaterally; No rales, rhonchi, wheezing, or rubs  HEART: irregular, No murmurs, rubs, or gallops  ABDOMEN: Soft, Nontender, Nondistended; Bowel sounds present  EXTREMITIES:  2+ Peripheral Pulses, No clubbing, cyanosis, or edema  LYMPH: No lymphadenopathy noted  SKIN: No rashes or lesions    LABS:                        11.2   10.6  )-----------( 125      ( 25 Sep 2017 08:16 )             35.4     09-25    145  |  107  |  43<H>  ----------------------------<  144<H>  3.0<L>   |  26  |  1.48<H>    Ca    8.2<L>      25 Sep 2017 08:16    TPro  7.3  /  Alb  2.8<L>  /  TBili  1.2  /  DBili  x   /  AST  180<H>  /  ALT  159<H>  /  AlkPhos  116  09-24    PT/INR - ( 24 Sep 2017 18:48 )   PT: 14.1 sec;   INR: 1.29 ratio         PTT - ( 24 Sep 2017 18:48 )  PTT:30.5 sec  Urinalysis Basic - ( 24 Sep 2017 21:43 )    Color: Yellow / Appearance: x / S.020 / pH: x  Gluc: x / Ketone: Trace  / Bili: Negative / Urobili: 1 mg/dL   Blood: x / Protein: 100 mg/dL / Nitrite: Negative   Leuk Esterase: Trace / RBC: 0-2 /HPF / WBC 0-2   Sq Epi: x / Non Sq Epi: x / Bacteria: x      CAPILLARY BLOOD GLUCOSE          RADIOLOGY & ADDITIONAL TESTS:    Imaging Personally Reviewed:  [x] YES  [ ] NO    Consultant(s) Notes Reviewed:  [ ] YES  [ ] NO    Care Discussed with Consultants/Other Providers [ ] YES  [ ] NO

## 2017-09-26 NOTE — CONSULT NOTE ADULT - SUBJECTIVE AND OBJECTIVE BOX
Patient is a 88y old  Female who presents with a chief complaint of lethargy and fall (24 Sep 2017 23:16)      HPI:  Pt is a 89 y/o female  PMH of glaucoma, HTN, HLD, paroxysmal Afib, CHF, s/p PPM presents to ED with family for generalized weakness and fall today.   pt states over the last 2 days been more sleepy and tired and today as she was walking in the [place] fell to side but pt was able to save the fall slowly and landed on her side, no head trauma or injury and comes to ed. pt denied any fever, chills, sob, cp palpitations n/v/d/c, no sick contacts or travels, no eating difficulty, lives alone has a (24 Sep 2017 23:16)    Pt was found to be increasingly lethargic and sepsis febrile to 100.2F, and increased WOB.  ICU c/s was called, pt was evaluated and assessed, pt was found to be hypoxemic on 6L NC to SpO2 85-88%, labored breathing with accessory muscle use, tachycardic to the 110-120's.  LA of 6.6 , CXR showing worsening right sided PNA.  Pt admitted to the ICU for hypoxic respiratory failure likely 2/2 to PNA, severe sepsis with lactic acidosis     Allergies    lactose (Unknown)  No Known Drug Allergies    Intolerances    MEDICATIONS  (STANDING):  piperacillin/tazobactam IVPB. 3.375 Gram(s) IV Intermittent every 12 hours  vancomycin  IVPB 500 milliGRAM(s) IV Intermittent every 24 hours  heparin  Injectable 5000 Unit(s) SubCutaneous every 12 hours  losartan 50 milliGRAM(s) Oral daily  metoprolol succinate ER 25 milliGRAM(s) Oral daily  brimonidine 0.2% Ophthalmic Solution 1 Drop(s) Both EYES two times a day  latanoprost 0.005% Ophthalmic Solution 1 Drop(s) Both EYES at bedtime  dorzolamide 2% Ophthalmic Solution 1 Drop(s) Both EYES two times a day  famotidine    Tablet 20 milliGRAM(s) Oral daily  amiodarone    Tablet 200 milliGRAM(s) Oral daily  aspirin enteric coated 81 milliGRAM(s) Oral daily  atorvastatin 40 milliGRAM(s) Oral at bedtime  potassium chloride   Powder 40 milliEquivalent(s) Oral every 4 hours  azithromycin  IVPB      azithromycin  IVPB 500 milliGRAM(s) IV Intermittent once    MEDICATIONS  (PRN):    Daily     Daily Weight in k.1 (26 Sep 2017 14:02)    Drug Dosing Weight  Height (cm): 160.02 (24 Sep 2017 17:22)  Weight (kg): 48.8 (25 Sep 2017 07:02)  BMI (kg/m2): 19.1 (25 Sep 2017 07:02)  BSA (m2): 1.49 (25 Sep 2017 07:02)    PAST MEDICAL & SURGICAL HISTORY:  Pulmonary hypertension  PAF (paroxysmal atrial fibrillation)  Glaucoma  High cholesterol  HTN (hypertension)  Artificial pacemaker    FAMILY HISTORY:  No pertinent family history in first degree relatives    SOCIAL HISTORY:    REVIEW OF SYSTEMS:    CONSTITUTIONAL: chills   RESPIRATORY: shortness of breath  CARDIOVASCULAR: No chest pain, palpitations,  GASTROINTESTINAL: No abdominal or epigastric pain. No nausea, vomiting, or hematemesis; No diarrhea or constipation. No melena or hematochezia.  NEUROLOGICAL: No headaches, memory loss, loss of strength, numbness, or tremors    ICU Vital Signs Last 24 Hrs  T(C): 37.7 (26 Sep 2017 15:00), Max: 38.2 (25 Sep 2017 23:50)  T(F): 99.8 (26 Sep 2017 15:00), Max: 100.8 (25 Sep 2017 23:50)  HR: 90 (26 Sep 2017 16:05) (67 - 125)  BP: 122/63 (26 Sep 2017 16:05) (100/50 - 151/93)  BP(mean): --  ABP: --  ABP(mean): --  RR: 20 (26 Sep 2017 16:05) (16 - 24)  SpO2: 86% (26 Sep 2017 15:00) (86% - 97%)      ABG - ( 26 Sep 2017 16:28 )  pH: x     /  pCO2: 28    /  pO2: 59    / HCO3: 19    / Base Excess: -3.4  /  SaO2: 91        I&O's Detail    25 Sep 2017 07:01  -  26 Sep 2017 07:00  --------------------------------------------------------  IN:    Oral Fluid: 620 mL    Solution: 300 mL    Solution: 100 mL    Solution: 100 mL  Total IN: 1120 mL    OUT:  Total OUT: 0 mL    Total NET: 1120 mL      26 Sep 2017 07:  -  26 Sep 2017 16:44  --------------------------------------------------------  IN:    Oral Fluid: 120 mL  Total IN: 120 mL    OUT:    Voided: 300 mL  Total OUT: 300 mL    Total NET: -180 mL          PHYSICAL EXAM:    GENERAL: ill-appearing, increased WOB, accessory muscle use   EYES: EOMI, PERRLA, conjunctiva and sclera clear  NECK: noted accessory neck muscles retracting with breathing   NERVOUS SYSTEM:  Alert & Oriented X3, Good concentration; Not able to complete full sentences with breathing, Motor Strength 3/5 B/L upper and lower extremities;  CHEST/LUNG: BL crackles at the lung bases, noted wheezing, breathing labored   HEART: Regular rate and rhythm; No murmurs, rubs, or gallops  ABDOMEN: Soft, Nontender, Nondistended; Bowel sounds present  EXTREMITIES:  2+ Peripheral Pulses, No clubbing, cyanosis, or edema    LABS:  CBC Full  -  ( 26 Sep 2017 15:55 )  WBC Count : 7.6 K/uL  Hemoglobin : 12.4 g/dL  Hematocrit : 39.5 %  Platelet Count - Automated : 140 K/uL  Mean Cell Volume : 96.1 fl  Mean Cell Hemoglobin : 30.1 pg  Mean Cell Hemoglobin Concentration : 31.3 gm/dL  Auto Neutrophil # : x  Auto Lymphocyte # : x  Auto Monocyte # : x  Auto Eosinophil # : x  Auto Basophil # : x  Auto Neutrophil % : x  Auto Lymphocyte % : x  Auto Monocyte % : x  Auto Eosinophil % : x  Auto Basophil % : x        142  |  108  |  49<H>  ----------------------------<  151<H>  3.4<L>   |  24  |  1.45<H>    Ca    8.2<L>      26 Sep 2017 07:57    TPro  5.7<L>  /  Alb  2.0<L>  /  TBili  1.3<H>  /  DBili  x   /  AST  69<H>  /  ALT  92<H>  /  AlkPhos  73  09-26    CAPILLARY BLOOD GLUCOSE        PT/INR - ( 24 Sep 2017 18:48 )   PT: 14.1 sec;   INR: 1.29 ratio         PTT - ( 24 Sep 2017 18:48 )  PTT:30.5 sec  Urinalysis Basic - ( 24 Sep 2017 21:43 )    Color: Yellow / Appearance: x / S.020 / pH: x  Gluc: x / Ketone: Trace  / Bili: Negative / Urobili: 1 mg/dL   Blood: x / Protein: 100 mg/dL / Nitrite: Negative   Leuk Esterase: Trace / RBC: 0-2 /HPF / WBC 0-2   Sq Epi: x / Non Sq Epi: x / Bacteria: x      CARDIAC MARKERS ( 26 Sep 2017 07:57 )  .111 ng/mL / x     / x     / x     / x      CARDIAC MARKERS ( 25 Sep 2017 08:10 )  .118 ng/mL / x     / 104 U/L / x     / 0.8 ng/mL  CARDIAC MARKERS ( 24 Sep 2017 19:16 )  .082 ng/mL / x     / 81 U/L / x     / 0.9 ng/mL    ECHO, US:     1. Left ventricular ejection fraction, by visual estimation, is 60 to   65%.   2. Normal left ventricular size and wall thicknesses, with normal   systolic and diastolic function.   3. Spectral Doppler shows impaired relaxation pattern of left   ventricular myocardial filling (Grade I diastolic dysfunction).   4. There is mild concentric left ventricular hypertrophy.   5. Normal right ventricular size and function.   6. Mildly dilated right atrium.   7. Moderate pleural effusion in the left lateral region.   8. Trivial pericardial effusion.   9. Mild mitral valve regurgitation.  10. Moderate-severe tricuspid regurgitation.  11. Estimated pulmonary artery systolic pressure is 74.6 mmHg assuming a   right atrial pressure of 5 mmHg, which is consistent with severe   pulmonary hypertension.  12. Mildly enlarged left atrium.    RADIOLOGY:    < from: Xray Chest 1 View AP/PA. (17 @ 16:11) >  Impression: Mild right pleural effusion.    Density in the right midlung zone, increased since the previous   examination. Again if clinically indicated, chest CT may be pursued for   further evaluation.    Small left pleural effusion.    No gross evidence for pneumothorax.    Stable cardiac silhouette and left cardiac device.      < end of copied text >      CRITICAL CARE TIME SPENT:

## 2017-09-26 NOTE — PROGRESS NOTE ADULT - PROBLEM SELECTOR PLAN 1
cw/ tele  likely from pna  blood cultures  zosyn/vanco  id consult
resolved  sec to pna
-Lasix IVP stat, Atrovent nebulizer  -Labs-ABG, Lactate, BNP, CXR  -Critical Care Consult

## 2017-09-26 NOTE — DIETITIAN INITIAL EVALUATION ADULT. - NS AS NUTRI INTERV MEALS SNACK
Mineral - modified diet/Texture-modified diet/Recommend Dysphagia 1 Pureed- Thin Liquids , Low sodium diet

## 2017-09-26 NOTE — DIETITIAN INITIAL EVALUATION ADULT. - FACTORS AFF FOOD INTAKE
difficulty chewing/difficulty feeding self/persistent lack of appetite/pt reports problems c chewing, RN reports that pt had trouble tolerating fish , recommend Dysphagia 1 Pureed- Thin Liquids

## 2017-09-26 NOTE — PROCEDURE NOTE - PROCEDURE
<<-----Click on this checkbox to enter Procedure Intubation of trachea  09/26/2017    Active  MDURST

## 2017-09-26 NOTE — PROGRESS NOTE ADULT - PROBLEM SELECTOR PLAN 2
troponin slightly trending up  asa  meghann  tte  cardio consult
cot current antibiotics  fever today x 1   follow up on c/s neg so far
Replete Potassium  Repeat labs in AM

## 2017-09-26 NOTE — DIETITIAN INITIAL EVALUATION ADULT. - PERTINENT LABORATORY DATA
09-26 Na142 mmol/L Glu 151 mg/dL<H> K+ 3.4 mmol/L<L> Cr  1.45 mg/dL<H> BUN 49 mg/dL<H> Est GFR (AA)37 mL/min/1.73M2<L> Ca 8.2 mg/dL<L> Alb 2.0 g/dL<L> LFTs<H>09-25 Hgb 11.2 g/dL<L> Hct 35.4% CHOL 104 mg/dL 09-24 bun 45 mg/dL<H> Cr 1.73 mg/dL<H> Glucose 255 mg/dL<H> Alb 2.8 g/dL<L> 09-26 Na142 mmol/L Glu 151 mg/dL<H> K+ 3.4 mmol/L<L> Cr  1.45 mg/dL<H> BUN 49 mg/dL<H> Est GFR (AA)37 mL/min/1.73M2<L> Ca 8.2 mg/dL<L> Alb 2.0 g/dL<L> LFTs<H>09-25 Hgb 11.2 g/dL<L> Hct 35.4% CHOL 104 mg/dL 09-24 bun 45 mg/dL<H> Cr 1.73 mg/dL<H> Glucose 255 mg/dL<H, w/o hx of DM> Alb 2.8 g/dL<L>

## 2017-09-26 NOTE — PROGRESS NOTE ADULT - ATTENDING COMMENTS
89 y/o female with PHTN admitted for lethargy and found to have PNA and started on IV abx.  Pt today became more dyspneic throughout the day requiring increased O2 to maintain O2 sats and now using respiratory muscles and unable to speak in full sentences. ICU consulted for need for more  acute care

## 2017-09-26 NOTE — CONSULT NOTE ADULT - ASSESSMENT
89 y/o female  PMH of glaucoma, HTN, HLD, paroxysmal Afib, diastolic CHF, s/p PPM, and severe PHTN (Pulm artery pressure 74.6mmHg) presents to ED with family for generalized weakness, admitted to the ICU for hypoxic respiratory failure likely 2/2 to PNA with worsening lethargy/AMS meeting sepsis criteria with a metabolic lactic acidosis (LA 6).      Plan    1.  Hypoxic Respiratory Failure  - BiPAP   - 87 y/o female  PMH of glaucoma, HTN, HLD, paroxysmal Afib, diastolic CHF, s/p PPM, and severe PHTN (Pulm artery pressure 74.6mmHg) presents to ED with family for generalized weakness, admitted to the ICU for hypoxic respiratory failure likely 2/2 to PNA with worsening lethargy/AMS meeting sepsis criteria with a metabolic lactic acidosis (LA 6).      Plan    1.  Hypoxic Respiratory Failure likely 2/2 to PNA   - BiPAP as needed, cautious with positive pressure ventilation as it may exacerbate severe PHTN   -Intubation may needed if hypoxia and WOB despite BiPAP  -Cover with broad spectrum ABX including atypicals   -abg's as needed, titrate oxygen,   -r/o flu and follow up viral panal, follow up cultures, and urine for legionella  -POCUS:  BL B-lines with right sided pleural effusion and consolidation   -Will give 80 Lasix IVP, and monitor UO,   -Insert Boyd   -Will trend LA and CE

## 2017-09-26 NOTE — DIETITIAN INITIAL EVALUATION ADULT. - ENERGY NEEDS
Height (cm): 160.02 (09-24)  Weight (kg): 48.8 (09-25)  BMI (kg/m2): 19.1 (09-25)  IBW: 52.1 kg   % IBW: 93%            UBW:              %UBW Height (cm): 160.02 (09-24)  Weight (kg): 48.8 (09-25)  BMI (kg/m2): 19.1 (09-25)  IBW: 52.1 kg   % IBW: 93%     UBW:  63.5 kg   %UBW: 76%

## 2017-09-27 VITALS — SYSTOLIC BLOOD PRESSURE: 195 MMHG | DIASTOLIC BLOOD PRESSURE: 27 MMHG | RESPIRATION RATE: 15 BRPM

## 2017-09-27 LAB
LACTATE SERPL-SCNC: 11.4 MMOL/L — CRITICAL HIGH (ref 0.7–2)
LEGIONELLA AG UR QL: POSITIVE
PROCALCITONIN SERPL-MCNC: 12.96 NG/ML — HIGH (ref 0–0.04)

## 2017-09-27 PROCEDURE — 71010: CPT | Mod: 26

## 2017-09-27 RX ORDER — ACETYLCYSTEINE 200 MG/ML
3 VIAL (ML) MISCELLANEOUS EVERY 4 HOURS
Qty: 0 | Refills: 0 | Status: DISCONTINUED | OUTPATIENT
Start: 2017-09-27 | End: 2017-09-27

## 2017-09-27 RX ORDER — IPRATROPIUM/ALBUTEROL SULFATE 18-103MCG
3 AEROSOL WITH ADAPTER (GRAM) INHALATION EVERY 4 HOURS
Qty: 0 | Refills: 0 | Status: DISCONTINUED | OUTPATIENT
Start: 2017-09-27 | End: 2017-09-27

## 2017-09-27 RX ORDER — VECURONIUM BROMIDE 20 MG/1
5 INJECTION, POWDER, FOR SOLUTION INTRAVENOUS ONCE
Qty: 0 | Refills: 0 | Status: COMPLETED | OUTPATIENT
Start: 2017-09-27 | End: 2017-09-27

## 2017-09-27 RX ADMIN — VECURONIUM BROMIDE 5 MILLIGRAM(S): 20 INJECTION, POWDER, FOR SOLUTION INTRAVENOUS at 01:02

## 2017-09-27 NOTE — DISCHARGE NOTE FOR THE EXPIRED PATIENT - HOSPITAL COURSE
Pt is a 89 y/o female  PMH of glaucoma, HTN, HLD, paroxysmal Afib, CHF, s/p PPM presents to ED with family for generalized weakness and fall today.   pt states over the last 2 days been more sleepy and tired and today as she was walking in the hhouse fell to side but pt was able to save the fall slowly and landed on her side, no head trauma or injury and comes to ed. pt denied any fever, chills, sob, cp palpitations n/v/d/c, no sick contacts or travels, no eating difficulty, lives alone has a. Pt admitted to tele for pna.     Pt was found to be increasingly lethargic and sepsis febrile to 100.2F, and increased WOB.  ICU c/s was called, pt was evaluated and assessed, pt was found to be hypoxemic on 6L NC to SpO2 85-88%, labored breathing with accessory muscle use, tachycardic to the 110-120's.  LA of 6.6 , CXR showing worsening right sided PNA.  Pt admitted to the ICU for hypoxic respiratory failure likely 2/2 to PNA, severe sepsis with lactic acidosis.     Pt intubated in CCU for respiratory distress and hypoxemia. Pt condition continued to decline. EICU attending aware. Pt went into PEA arrest. Rosc after 7 min. Family present, states that they wanted to continue CPR. PT went into PEA arrest a second time. Pt  0153.     Pt asystole in leads II and V. Pupils fixed and dilated. No response to sternal rub. No response to verbal stimuli. No spontaneous breath sounds. No heart sounds noted. No carotid or femoral pulses noted. Pt pronounced at 0153. Family at bedside. EICU attending Dr. Rao aware.

## 2017-09-27 NOTE — PROVIDER CONTACT NOTE (EICU) - BACKGROUND
Pt admitted with right sided PNA and heart failure that decompensated on the floor and was admitted to the ICU where she was intubated for worsening hypoxemia and WOB.  Attempts were being made to obtain an abg when she lost a pulse and she underwent CPR for approx 7 min, PEA arrest (patient has a pacer)  had epi x3 with ROSC.   Again attempts were being made to get an arterial blood gas- patient again with PEA arrest initially with devolution to asystole - code was called and CPR was performed according to ACLS protocol without any return of spontaneous circulation. Code was stopped at 1:59am.

## 2017-09-29 DIAGNOSIS — I24.9 ACUTE ISCHEMIC HEART DISEASE, UNSPECIFIED: ICD-10-CM

## 2017-09-29 DIAGNOSIS — Z91.018 ALLERGY TO OTHER FOODS: ICD-10-CM

## 2017-09-29 DIAGNOSIS — E87.2 ACIDOSIS: ICD-10-CM

## 2017-09-29 DIAGNOSIS — N17.9 ACUTE KIDNEY FAILURE, UNSPECIFIED: ICD-10-CM

## 2017-09-29 DIAGNOSIS — H40.9 UNSPECIFIED GLAUCOMA: ICD-10-CM

## 2017-09-29 DIAGNOSIS — Z95.810 PRESENCE OF AUTOMATIC (IMPLANTABLE) CARDIAC DEFIBRILLATOR: ICD-10-CM

## 2017-09-29 DIAGNOSIS — A41.9 SEPSIS, UNSPECIFIED ORGANISM: ICD-10-CM

## 2017-09-29 DIAGNOSIS — R65.20 SEVERE SEPSIS WITHOUT SEPTIC SHOCK: ICD-10-CM

## 2017-09-29 DIAGNOSIS — I48.0 PAROXYSMAL ATRIAL FIBRILLATION: ICD-10-CM

## 2017-09-29 DIAGNOSIS — J15.6 PNEUMONIA DUE TO OTHER GRAM-NEGATIVE BACTERIA: ICD-10-CM

## 2017-09-29 DIAGNOSIS — E87.6 HYPOKALEMIA: ICD-10-CM

## 2017-09-29 DIAGNOSIS — I46.9 CARDIAC ARREST, CAUSE UNSPECIFIED: ICD-10-CM

## 2017-09-29 DIAGNOSIS — I50.33 ACUTE ON CHRONIC DIASTOLIC (CONGESTIVE) HEART FAILURE: ICD-10-CM

## 2017-09-29 DIAGNOSIS — E78.5 HYPERLIPIDEMIA, UNSPECIFIED: ICD-10-CM

## 2017-09-29 DIAGNOSIS — I27.2 OTHER SECONDARY PULMONARY HYPERTENSION: ICD-10-CM

## 2017-09-29 DIAGNOSIS — J96.91 RESPIRATORY FAILURE, UNSPECIFIED WITH HYPOXIA: ICD-10-CM

## 2017-09-29 DIAGNOSIS — E43 UNSPECIFIED SEVERE PROTEIN-CALORIE MALNUTRITION: ICD-10-CM

## 2017-09-29 DIAGNOSIS — I11.0 HYPERTENSIVE HEART DISEASE WITH HEART FAILURE: ICD-10-CM

## 2017-09-29 DIAGNOSIS — R74.8 ABNORMAL LEVELS OF OTHER SERUM ENZYMES: ICD-10-CM

## 2017-09-29 DIAGNOSIS — E86.0 DEHYDRATION: ICD-10-CM

## 2017-09-29 DIAGNOSIS — R55 SYNCOPE AND COLLAPSE: ICD-10-CM

## 2018-02-27 NOTE — CONSULT NOTE ADULT - CONSULT REQUESTED DATE/TIME
After Visit Summary   2/27/2018    Reina Quan    MRN: [id number]           Patient Information     Date Of Birth          1965        Visit Information        Provider Department      2/27/2018 1:30 PM [facility]        Today's Diagnoses     Dry eye    -  1    Long-term use of Plaquenil        Blepharoptosis, bilateral           Follow-ups after your visit        Follow-up notes from your care team     Return in about 1 year (around 2/27/2019) for OCT Macula, Dynamic VF 10-2.      Your next 10 appointments already scheduled     Apr 17, 2018 10:45 AM CDT   (Arrive by 10:30 AM)   Return Visit with Brad Bauman MD   [facility] ([facility] [place])    [address]  [address] [facility] [address] [phone]              Who to contact     Please call your clinic at [phone] to:    Ask questions about your health    Make or cancel appointments    Discuss your medicines    Learn about your test results    Speak to your doctor            Additional Information About Your Visit        MyChart Information     "[employer] gives you secure access to your electronic health record. If you see a primary care provider, you can also send messages to your care team and make appointments. If you have questions, please call your primary care clinic.  If you do not have a primary care provider, please call [phone] and they will assist you.      "[employer] is an electronic gateway that provides easy, online access to your medical records. With "[employer] you can request a clinic appointment, read your test results, renew a prescription or communicate with your care team.     To access your existing account, please contact your [facility] or call [phone] for assistance.        Care EveryWhere ID     This is your Care EveryWhere ID. This could be used by other organizations to access your [facility]  records  [id number]        Your Vitals Were     Last Period                   05/14/2014            Blood Pressure from Last 3 Encounters:   02/16/18 130/87   10/25/17 133/85   07/11/17 128/80    Weight from Last 3 Encounters:   02/16/18 58.2 kg (128 lb 6.4 oz)   07/11/17 58.9 kg (129 lb 14.4 oz)   02/17/17 57.5 kg (126 lb 11.2 oz)              We Performed the Following     Fundus Autofluorescence Image (FAF) OU (both eyes)     OCT Retina Spectralis OU (both eyes)     Ophth TearLab Test        Primary Care Provider Office Phone # Fax #    Velvet Luci Smith PA-C [phone]       [address] [address] [facility] [address]        Equal Access to Services     ANTONIA HUERTAS : Hadii tamia grover hadasho Soomaali, waaxda luqadaha, qaybta kaalmada adeegyada, adriane ugarte . So [facility] [phone].    ATENCIÓN: Si habla español, tiene a simms disposición servicios gratuitos de asistencia lingüística. Di al [phone].    We comply with applicable federal civil rights laws and [place] laws. We do not discriminate on the basis of race, color, national origin, age, disability, sex, sexual orientation, or gender identity.            Thank you!     Thank you for choosing [facility]  for your care. Our goal is always to provide you with excellent care. Hearing back from our patients is one way we can continue to improve our services. Please take a few minutes to complete the written survey that you may receive in the mail after your visit with us. Thank you!             Your Updated Medication List - Protect others around you: Learn how to safely use, store and throw away your medicines at www.disposemymeds.org.          This list is accurate as of 2/27/18  3:33 PM.  Always use your most recent med list.                   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    Lichen planopilaris       cromolyn 4 % ophthalmic solution    OPTICROM    1 Bottle    Place 1 drop into both eyes 4 times daily    Other chronic allergic conjunctivitis       hydroxychloroquine 200 MG tablet    PLAQUENIL    60 tablet    Take 1 tablet (200 mg) by mouth 2 times daily    Lichen planopilaris       ketoconazole 2 % shampoo    NIZORAL    120 mL    Apply topically daily as needed for itching or irritation    Lichen planopilaris       lisinopril 5 MG tablet    PRINIVIL/ZESTRIL    30 tablet    Take 1 tablet (5 mg) by mouth daily    Hypertension, unspecified type       nitroFURantoin (macrocrystal-monohydrate) 100 MG capsule    MACROBID    14 capsule    One by mouth every other day    Urinary tract infection, site unspecified       PARoxetine 20 MG tablet    PAXIL    90 tablet    Take 1 tablet every morning.    Dysthymic disorder       predniSONE 5 MG tablet    DELTASONE    15 tablet    Take 1 tablet (5 mg) by mouth every other day    Kidney replaced by transplant       sirolimus 1 MG tablet    GENERIC EQUIVALENT    90 tablet    Take 3 tablets (3 mg) by mouth daily    Living-donor kidney transplant recipient       sulfamethoxazole-trimethoprim 400-80 MG per tablet    BACTRIM/SEPTRA    90 tablet    Take 1 tablet by mouth daily    Urinary tract infection without hematuria, site unspecified

## 2019-05-30 NOTE — DIETITIAN INITIAL EVALUATION ADULT. - PROBLEM/PLAN-3
Arrived per ambulation for scheduled injection  
Discharged from department per ambulation  
Tolerated Xolair injections, epi pen available on bedside table  
DISPLAY PLAN FREE TEXT

## 2021-10-20 NOTE — PROGRESS NOTE ADULT - ASSESSMENT
88 woman with PMH of glaucoma, HTN, HLD, paf, CHF, s/p PPM presents to ED  w/lethargy and fall w/pna and elevated troponin.  Pt is afebrile and with out complaints of  chest pain or SOB. Pt denies any syncope prior to admission. Pt is currently being treated for PNA and being evaluated by cardiology.
88 ry old female seen with
88 year old female in acute respiratory distress. Patient seen in bed using accessory muscles to breathe. O2 sat 89% on 3L. Critical care consult initiated for transfer to ICU.
Additional Progress Note...

## 2022-05-21 NOTE — CONSULT NOTE ADULT - ASSESSMENT
Elderly  female known to my service from previous admissions. As per ER notes patient admitted for increasing weakness and malaise over the past several days. Denies any recent worsening dyspnea or chest pain. Patient felt increasingly weak and had a near syncopal episode on day of admission. Patient has known history of hypertension, hyperlipidemia, atrial fibrillation, history of diastolic heart failure, status post pacemaker. It is unknown the patient has had any recent interrogation of her device although she claims she has seen her cardiologist within the past month. Physical examination and radiological findings appear consistent with possible pneumonia which may explain her condition. At present, no evidence of acute cardiac event appears likely, despite borderline elevated cardiac enzymes. Patient has known severe pulmonary hypertension for medical management only. Suggest 24 hours of telemetry to rule out significant dysrhythmia and ambulate to reassess for orthostatics, continue medical management to possible underlying pneumonia; no contraindications to liberal use of IV fluid if necessary. No

## 2022-10-07 NOTE — CONSULT NOTE ADULT - ASSESSMENT
87 yr old female seen with
87-year-old female admitted with nonhealing wound ulcer and atypical complaints of "A wave "over her pacemaker. Recent echocardiogram shows normal left ventricular systolic function with evidence of severe pulmonary hypertension and tricuspid insufficiency. This is the most likely source of her lower extremity edema. Will get pacemaker interrogation. No evidence of acute coronary insufficiency, can discontinue telemetry. Will try to obtain records of recent nuclear stress testing done by primary cardiologist. Continue wound care.
No

## 2023-03-04 NOTE — H&P ADULT. - EKG AND INTERPRETATION
.  Latest blood pressure and vitals reviewed-   Temp:  [97.6 °F (36.4 °C)-98.7 °F (37.1 °C)]   Pulse:  [68-87]   Resp:  [16-19]   BP: ()/(40-92)   SpO2:  [94 %-99 %] .   Home meds for hypertension were reviewed and noted below.   Hypertension Medications             isosorbide mononitrate (IMDUR) 60 MG 24 hr tablet TAKE 1 TABLET BY MOUTH ONCE DAILY    labetaloL (NORMODYNE) 100 MG tablet TAKE 1 TABLET BY MOUTH TWICE A DAY    losartan (COZAAR) 50 MG tablet Take 50 mg by mouth once daily.    nitroGLYCERIN (NITROSTAT) 0.4 MG SL tablet Place 1 tablet (0.4 mg total) under the tongue every 5 (five) minutes as needed for Chest pain.          While in the hospital, will manage blood pressure as follows; Hold lebetalol  Will utilize p.r.n. blood pressure medication only if patient's blood pressure greater than  180/110 and she develops symptoms such as worsening chest pain or shortness of breath.       paced rhythm

## 2023-07-26 NOTE — PHYSICAL THERAPY INITIAL EVALUATION ADULT - WEIGHT-BEARING RESTRICTIONS: STAND/SIT, REHAB EVAL
Anesthesia Post Evaluation    Patient: Shawna Mayers    Procedure(s) Performed: Procedure(s) (LRB):  ARTHROSCOPY, KNEE, WITH MENISCECTOMY (Right)  ARTHROSCOPY, KNEE, WITH CHONDROPLASTY (Right)    Final Anesthesia Type: general      Patient location during evaluation: PACU  Patient participation: Yes- Able to Participate  Level of consciousness: awake and alert and oriented  Post-procedure vital signs: reviewed and stable  Pain management: adequate  Airway patency: patent  SOULEYMANE mitigation strategies: Multimodal analgesia  PONV status at discharge: No PONV  Anesthetic complications: no      Cardiovascular status: blood pressure returned to baseline and hemodynamically stable  Respiratory status: unassisted, spontaneous ventilation and room air  Hydration status: euvolemic  Follow-up not needed.          Vitals Value Taken Time   /86 07/26/23 1332   Temp 36.6 °C (97.9 °F) 07/26/23 1315   Pulse 64 07/26/23 1339   Resp 18 07/26/23 1339   SpO2 93 % 07/26/23 1339   Vitals shown include unvalidated device data.      Event Time   Out of Recovery 13:00:00         Pain/Ewa Score: Pain Rating Prior to Med Admin: 5 (7/26/2023  1:29 PM)  Pain Rating Post Med Admin: 5 (7/26/2023  1:29 PM)  Ewa Score: 10 (7/26/2023  1:15 PM)         full weight-bearing

## 2024-01-08 NOTE — H&P ADULT. - MARITAL STATUS
January 8, 2024    [address]   [address]  [facility] [address]      Dear MsLisa Vikram      Thank you for choosing [facility]. Your health is our foremost concern, which is why we want to inform you that you recently missed your appointment on 1/8/2024     We understand unexpected circumstances arise; however, anytime you miss your appointment we are unable to provide you appropriate care.  In addition, each appointment missed could have been used to provide care for others.  We ask that you call at least 24 hours in advance to cancel or reschedule an appointment.  We would like to take this opportunity to remind you of our policy stating patients who miss three or more appointments without cancelling or rescheduling 24 hours in advance of the appointment may be subject to dismissal from the practice.     Please call the number above to reschedule your appointment. If there are reasons that make it difficult for you to keep the appointments, please call and let us know.  If possible, we will try to assist you.     Again, we value you as a patient and thank you for allowing [facility] to provide for all of your healthcare needs.      Sincerely,    [facility]     Single

## 2024-09-10 NOTE — DIETITIAN INITIAL EVALUATION ADULT. - NS FNS WEIGHT USED FOR CALC
Called patient to discuss Dr. Ortega result note and recommendation. LMOM with call back number and result note sent to patient portal. Result letter mailed to address in chart.     ideal/52.5

## 2025-01-09 NOTE — INPATIENT CERTIFICATION FOR MEDICARE PATIENTS - RISKS OF ADVERSE EVENTS
Concern for cardiopulmonary deterioration Patient requests all Lab, Cardiology, and Radiology Results on their Discharge Instructions